# Patient Record
Sex: MALE | Race: WHITE | NOT HISPANIC OR LATINO | Employment: FULL TIME | URBAN - METROPOLITAN AREA
[De-identification: names, ages, dates, MRNs, and addresses within clinical notes are randomized per-mention and may not be internally consistent; named-entity substitution may affect disease eponyms.]

---

## 2018-07-19 ENCOUNTER — OFFICE VISIT (OUTPATIENT)
Dept: FAMILY MEDICINE | Facility: CLINIC | Age: 33
End: 2018-07-19
Payer: COMMERCIAL

## 2018-07-19 VITALS
HEART RATE: 74 BPM | WEIGHT: 233.9 LBS | HEIGHT: 76 IN | TEMPERATURE: 97.8 F | RESPIRATION RATE: 16 BRPM | BODY MASS INDEX: 28.48 KG/M2 | OXYGEN SATURATION: 98 % | DIASTOLIC BLOOD PRESSURE: 78 MMHG | SYSTOLIC BLOOD PRESSURE: 112 MMHG

## 2018-07-19 DIAGNOSIS — Z00.00 ROUTINE GENERAL MEDICAL EXAMINATION AT A HEALTH CARE FACILITY: Primary | ICD-10-CM

## 2018-07-19 DIAGNOSIS — M72.2 PLANTAR FASCIITIS: ICD-10-CM

## 2018-07-19 DIAGNOSIS — Z13.6 CARDIOVASCULAR SCREENING; LDL GOAL LESS THAN 160: ICD-10-CM

## 2018-07-19 LAB
CHOLEST SERPL-MCNC: 211 MG/DL
HDLC SERPL-MCNC: 46 MG/DL
LDLC SERPL CALC-MCNC: 120 MG/DL
NONHDLC SERPL-MCNC: 165 MG/DL
TRIGL SERPL-MCNC: 223 MG/DL

## 2018-07-19 PROCEDURE — 80061 LIPID PANEL: CPT | Performed by: FAMILY MEDICINE

## 2018-07-19 PROCEDURE — 36415 COLL VENOUS BLD VENIPUNCTURE: CPT | Performed by: FAMILY MEDICINE

## 2018-07-19 PROCEDURE — 99395 PREV VISIT EST AGE 18-39: CPT | Performed by: FAMILY MEDICINE

## 2018-07-19 ASSESSMENT — PAIN SCALES - GENERAL: PAINLEVEL: NO PAIN (0)

## 2018-07-19 NOTE — MR AVS SNAPSHOT
After Visit Summary   7/19/2018    Zaire Olivares    MRN: [id number]           Patient Information     Date Of Birth          1985        Visit Information        Provider Department      7/19/2018 7:40 AM Luis Eddy MD [facility]        Today's Diagnoses     Routine general medical examination at a health care facility    -  1    CARDIOVASCULAR SCREENING; LDL GOAL LESS THAN 160        Plantar fasciitis          Care Instructions      Preventive Health Recommendations  Male Ages 26 - 39    Yearly exam:             See your health care provider every year in order to  o   Review health changes.   o   Discuss preventive care.    o   Review your medicines if your doctor has prescribed any.    You should be tested each year for STDs (sexually transmitted diseases), if you re at risk.     After age 35, talk to your provider about cholesterol testing. If you are at risk for heart disease, have your cholesterol tested at least every 5 years.     If you are at risk for diabetes, you should have a diabetes test (fasting glucose).  Shots: Get a flu shot each year. Get a tetanus shot every 10 years.     Nutrition:    Eat at least 5 servings of fruits and vegetables daily.     Eat whole-grain bread, whole-wheat pasta and brown rice instead of white grains and rice.     Get adequate Calcium and Vitamin D.     Lifestyle    Exercise for at least 150 minutes a week (30 minutes a day, 5 days a week). This will help you control your weight and prevent disease.     Limit alcohol to one drink per day.     No smoking.     Wear sunscreen to prevent skin cancer.     See your dentist every six months for an exam and cleaning.             Follow-ups after your visit        Who to contact     If you have questions or need follow up information about today's clinic visit or your schedule please contact [facility] directly at [phone].  Normal or non-critical lab and imaging  "results will be communicated to you by MyChart, letter or phone within 4 business days after the clinic has received the results. If you do not hear from us within 7 days, please contact the clinic through [email or web address] or phone. If you have a critical or abnormal lab result, we will notify you by phone as soon as possible.  Submit refill requests through [email or web address] or call your pharmacy and they will forward the refill request to us. Please allow 3 business days for your refill to be completed.          Additional Information About Your Visit        [email or web address] Information     [email or web address] gives you secure access to your electronic health record. If you see a primary care provider, you can also send messages to your care team and make appointments. If you have questions, please call your primary care clinic.  If you do not have a primary care provider, please call [phone] and they will assist you.        Care EveryWhere ID     This is your Care EveryWhere ID. This could be used by other organizations to access your [place] medical records  [id number]        Your Vitals Were     Pulse Temperature Respirations Height Pulse Oximetry BMI (Body Mass Index)    74 97.8  F (36.6  C) (Temporal) 16 6' 3.5\" (1.918 m) 98% 28.85 kg/m2       Blood Pressure from Last 3 Encounters:   07/19/18 112/78   07/18/16 110/66   02/05/16 118/70    Weight from Last 3 Encounters:   07/19/18 233 lb 14.4 oz (106.1 kg)   07/18/16 233 lb 3.2 oz (105.8 kg)   02/05/16 240 lb (108.9 kg)              We Performed the Following     Lipid Profile        Primary Care Provider Office Phone # Fax #    Luis Eddy [phone]       [address] [facility] [phone]        Equal Access to Services     MICK REECE AH: Hadmirela craig Sovilma, waaxda luqadaha, qaybta kaalmada adeegyada, ambrosio love. So [facility] [phone].    ATENCIÓN: Si habla español, tiene a hansen disposición servicios gratuitos de " cipriano lingüístickathy. Randa al [phone].    We comply with applicable federal civil rights laws and [place] laws. We do not discriminate on the basis of race, color, national origin, age, disability, sex, sexual orientation, or gender identity.            Thank you!     Thank you for choosing [facility]  for your care. Our goal is always to provide you with excellent care. Hearing back from our patients is one way we can continue to improve our services. Please take a few minutes to complete the written survey that you may receive in the mail after your visit with us. Thank you!             Your Updated Medication List - Protect others around you: Learn how to safely use, store and throw away your medicines at www.disposemymeds.org.      Notice  As of 7/19/2018  8:20 AM    You have not been prescribed any medications.

## 2018-07-19 NOTE — PROGRESS NOTES
SUBJECTIVE:   CC: Zaire Olivares is an 33 year old male who presents for preventative health visit.     Physical   Annual:     Getting at least 3 servings of Calcium per day:  Yes    Bi-annual eye exam:  NO    Dental care twice a year:  Yes    Sleep apnea or symptoms of sleep apnea:  None    Diet:  Regular (no restrictions)    Frequency of exercise:  2-3 days/week    Duration of exercise:  15-30 minutes    Taking medications regularly:  Yes    Medication side effects:  Not applicable, None, No muscle aches and No significant flushing        Discuss elbow pain; right     Today's PHQ-2 Score:   PHQ-2 ( 1999 [employer]) 7/19/2018   Q1: Little interest or pleasure in doing things 0   Q2: Feeling down, depressed or hopeless 0   PHQ-2 Score 0   Q1: Little interest or pleasure in doing things Not at all   Q2: Feeling down, depressed or hopeless Not at all   PHQ-2 Score 0       Abuse: Current or Past(Physical, Sexual or Emotional)- No  Do you feel safe in your environment - Yes    Social History   Substance Use Topics     Smoking status: Never Smoker     Smokeless tobacco: Never Used     Alcohol use 0.0 oz/week     0 Standard drinks or equivalent per week      Comment: weekend     Alcohol Use 7/19/2018   If you drink alcohol do you typically have greater than 3 drinks per day OR greater than 7 drinks per week? Not Applicable   No flowsheet data found.    Last PSA: No results found for: PSA    Reviewed orders with patient. Reviewed health maintenance and updated orders accordingly - Yes      Reviewed and updated as needed this visit by clinical staff  Tobacco  Allergies  Meds  Med Hx  Surg Hx  Fam Hx  Soc Hx        Reviewed and updated as needed this visit by Provider        History reviewed. No pertinent past medical history.   Past Surgical History:   Procedure Laterality Date     VASECTOMY  2/5/2016       Review of Systems  CONSTITUTIONAL: NEGATIVE for fever, chills, change in weight  INTEGUMENTARY/SKIN: NEGATIVE for  worrisome rashes, moles or lesions  EYES: NEGATIVE for vision changes or irritation  ENT: NEGATIVE for ear, mouth and throat problems  RESP: NEGATIVE for significant cough or SOB  CV: NEGATIVE for chest pain, palpitations or peripheral edema  GI: NEGATIVE for nausea, abdominal pain, heartburn, or change in bowel habits   male: negative for dysuria, hematuria, decreased urinary stream, erectile dysfunction, urethral discharge  MUSCULOSKELETAL: Some pain lateral anterior on the right elbow he feels he may have hyperextended it couple of weeks ago.  NEURO: NEGATIVE for weakness, dizziness or paresthesias  PSYCHIATRIC: NEGATIVE for changes in mood or affect    OBJECTIVE:   There were no vitals taken for this visit.    Physical Exam  GENERAL: healthy, alert and no distress  EYES: Eyes grossly normal to inspection, PERRL and conjunctivae and sclerae normal  HENT: ear canals and TM's normal, nose and mouth without ulcers or lesions  NECK: no adenopathy, no asymmetry, masses, or scars and thyroid normal to palpation  RESP: lungs clear to auscultation - no rales, rhonchi or wheezes  CV: regular rate and rhythm, normal S1 S2, no S3 or S4, no murmur, click or rub, no peripheral edema and peripheral pulses strong  ABDOMEN: soft, nontender, no hepatosplenomegaly, no masses and bowel sounds normal  MS: Full range of motion in fact extended extension of the right elbow noted.  No deformity.  Just slightly tender lateral to the lateral epicondyle.  SKIN: no suspicious lesions or rashes  NEURO: Normal strength and tone, mentation intact and speech normal  PSYCH: mentation appears normal, affect normal/bright        ASSESSMENT/PLAN:   1. CARDIOVASCULAR SCREENING; LDL GOAL LESS THAN 160  We will notify with results  - Lipid Profile    2. Plantar fasciitis  Referral to podiatry for orthotics    3. Routine general medical examination at a health care facility  Generally healthy.  He will follow-up with sports medicine of the elbow  "does not seem to be getting better.      COUNSELING:       BP Readings from Last 1 Encounters:   07/18/16 110/66     Estimated body mass index is 29.15 kg/(m^2) as calculated from the following:    Height as of 2/5/16: 6' 3\" (1.905 m).    Weight as of 7/18/16: 233 lb 3.2 oz (105.8 kg).           reports that he has never smoked. He has never used smokeless tobacco.      Counseling Resources:  ATP IV Guidelines  Pooled Cohorts Equation Calculator  FRAX Risk Assessment  ICSI Preventive Guidelines  Dietary Guidelines for Americans, 2010  USDA's MyPlate  ASA Prophylaxis  Lung CA Screening    Luis Eddy MD  [facility]  "

## 2018-07-30 NOTE — PROGRESS NOTES
Cholesterol was 211 which is borderline elevated.   LDL cholesterol was 120 which is acceptable.  Just watch fatty foods and calories in your diet

## 2018-08-12 ENCOUNTER — MYC MEDICAL ADVICE (OUTPATIENT)
Dept: FAMILY MEDICINE | Facility: CLINIC | Age: 33
End: 2018-08-12

## 2018-08-12 DIAGNOSIS — M25.521 RIGHT ELBOW PAIN: Primary | ICD-10-CM

## 2018-08-15 ENCOUNTER — RADIANT APPOINTMENT (OUTPATIENT)
Dept: GENERAL RADIOLOGY | Facility: CLINIC | Age: 33
End: 2018-08-15
Attending: ORTHOPAEDIC SURGERY
Payer: COMMERCIAL

## 2018-08-15 ENCOUNTER — OFFICE VISIT (OUTPATIENT)
Dept: ORTHOPEDICS | Facility: CLINIC | Age: 33
End: 2018-08-15
Payer: COMMERCIAL

## 2018-08-15 VITALS
HEIGHT: 76 IN | DIASTOLIC BLOOD PRESSURE: 73 MMHG | SYSTOLIC BLOOD PRESSURE: 114 MMHG | WEIGHT: 236 LBS | BODY MASS INDEX: 28.74 KG/M2

## 2018-08-15 DIAGNOSIS — M25.521 ELBOW PAIN, RIGHT: ICD-10-CM

## 2018-08-15 DIAGNOSIS — M77.11 LATERAL EPICONDYLITIS OF RIGHT ELBOW: Primary | ICD-10-CM

## 2018-08-15 PROCEDURE — 73080 X-RAY EXAM OF ELBOW: CPT | Mod: TC

## 2018-08-15 PROCEDURE — 99203 OFFICE O/P NEW LOW 30 MIN: CPT | Mod: 25 | Performed by: ORTHOPAEDIC SURGERY

## 2018-08-15 PROCEDURE — 20551 NJX 1 TENDON ORIGIN/INSJ: CPT | Mod: LT | Performed by: ORTHOPAEDIC SURGERY

## 2018-08-15 ASSESSMENT — PAIN SCALES - GENERAL: PAINLEVEL: NO PAIN (0)

## 2018-08-15 NOTE — PROGRESS NOTES
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  He has 4 boys all within 2 years of each other 9, 7, 5, 3.      He is being seen for   Chief Complaints and History of Present Illnesses   Patient presents with     Consult     rt elbow pain          History of Present Illness:   Mechanism of Injury: Patient's arm got caught behind him when he was playing basketball when he pulled his arm loose he heard a pop.  Location: Right upper extremity  Duration of Pain: Since May 2018  Rating of Pain: 0 out of 10 today  Pain Quality: Achy and sore with activity  Pain is better with: Rest  Pain is worse with: Activity such as baling hay.  Treatment so far consists of: No treatment no medications no injections no therapy  Associated Features: Lateral elbow pain.  Prior history of related problems: No previous history of surgery injury or trauma to the right elbow  Pain is Limiting:  nothing  Here to: Orthopedic consultation  The Pain Has: Been about the same but steadily has been getting better however it still is intermittent.  Additional History: Patient denies any numbness or tingling or catching or locking or swelling or bruising.  The pain is worse with increased activity.  He has been able to throw baseball without any problems.  If he is very active with that it will be sore for 2 days afterwards.    Patient's past medical, surgical, social and family histories reviewed.     No past medical history on file.      Past Surgical History:   Procedure Laterality Date     VASECTOMY  2/5/2016       Medications:    No current outpatient prescriptions on file prior to visit.  No current facility-administered medications on file prior to visit.     Allergies   Allergen Reactions     No Known Allergies        Social History     Occupational History     Not on file.     Social History  "Main Topics     Smoking status: Never Smoker     Smokeless tobacco: Never Used     Alcohol use 0.0 oz/week     0 Standard drinks or equivalent per week      Comment: weekend     Drug use: No     Sexual activity: Yes     Partners: Female     Birth control/ protection: Surgical, Male Surgical      Comment: vasectomy       Family History   Problem Relation Age of Onset     HEART DISEASE Father      HEART DISEASE Paternal Grandfather        REVIEW OF SYSTEMS  10 point review systems performed otherwise negative as noted as per history of present illness.    Physical Exam:  Vitals: /73  Ht 1.918 m (6' 3.5\")  Wt 107 kg (236 lb)  BMI 29.11 kg/m2  BMI= Body mass index is 29.11 kg/(m^2).    Constitutional: healthy, alert and no acute distress   Psychiatric: mentation appears normal and affect normal/bright  NEURO: no focal deficits, CMS intact right upper extremity  RESP: Normal with easy respirations and no use of accessory muscles to breathe, no audible wheezing or retractions  CV: +2 radial pulse and his hand is warm to palpation.   SKIN: No erythema, rashes, excoriation, or breakdown. No evidence of infection.   MUSCULOSKELETAL:    INSPECTION of right elbow: No gross deformities, erythema, edema, ecchymosis, atrophy or fasciculations.     PALPATION: No tenderness on palpation over the medial epicondyles or olecranon.  Tenderness to palpation over the lateral epicondyle more on the posterior side.    ROM: Patient able to hyperextend elbow approximately 5-10  and flexes to 160  of flexion.  Range of motion is without catching locking or pain.  Patient is able to supinate pronate without any problems either.     STRENGTH: 5 out of 5 biceps triceps and wrist flexion and extension today patient's wrist extension testing does cause pain at the lateral upper condyle.    SPECIAL TEST: None today.  GAIT: non-antalgic  Lymph: no palpable lymph nodes    Diagnostic Modalities:  Recent Results (from the past 744 hour(s)) "   XR Elbow Right G/E 3 Views    Narrative    RIGHT ELBOW THREE VIEWS  8/15/2018 3:20 PM     HISTORY:  Elbow pain.    COMPARISON: None.      Impression    IMPRESSION: Possible nondisplaced fracture of the neck of the radius  with cortical angulation on the AP view. However the fat pads are not  displaced.     We do not agree with the reading when correlated with the exam.  Independent visualization of the images was performed.    Impression: 1.  Right elbow lateral epicondylitis    Plan:  All of the above pertinent physical exam and imaging modalities findings was reviewed with Zaire.                                          INJECTION PROCEDURE:  The patient was counseled about an  injection, including discussion of risks (including infection), contents of the injection, rationale for performing the injection, and expected benefits of the injection. The skin was prepped with alcohol and betadine and then utilizing sterile technique an injection of the right elbow lateral epicondyle injection was performed. I palpated to find the area of most tenderness, then I used malina chloride spray prior to doing the injection. I made sure he did not have any shooting, burning or electric sensation prior to injecting. The injection consisted 1ml of Kenalog (40mg per 1ml) with 3ml 1% lidocaine plain. The patient tolerated the injection well, and there were no complications. The injection site was covered with a Band-Aid. The injection was performed by BRANDEN Kaur    Patient Instructions:  1. On exam this looks like lateral epicondylitis.  That is an irritation of the tendons that go across the outside of your elbow.   2. A forearm band can help take some of the pressure off the area.  Even a wrist brace can help.  We decided to hold on that for today.  3. Stretches such as wrist flexion stretches can help relax those tendons.  Those exercises are below.    4. Formal therapy can always help.  We talked about formal occupational  therapy, but decided to hold for now.    5. Mobic is a medication that could help you. We discussed the Mobic as a prescription anti-inflammatory medication you could also try but we decided to hold off on that today.    6. A cortisone injection can benefit you.  It is like taking very strong ibuprofen and putting it right were the pain and inflammation is.  7. You can followup with Donya Bey MD in 6 weeks, if you are having pain at that time we can do a cortisone injection.  You can always cancel the appointment if you are doing really well and follow-up as needed.    Re-x-ray on return: No    BP Readings from Last 1 Encounters:   08/15/18 114/73       BP noted to be well controlled today in office.      Patient does not use Tobacco products.    Scribed by Chai Vallejo PA-C on 8/15/2018 at 4:14 PM, based on Dr. Donya Bey's statements to me.    This note was dictated with [employer].    GIA Chen MD

## 2018-08-15 NOTE — LETTER
8/15/2018         RE: Zaire Olivares  [id number] [facility] [address]        Dear Colleague,    Thank you for referring your patient, Zaire Olivares, to the [facility]. Please see a copy of my visit note below.    Zaire Olivares is a 33 year old male who is seen in consultation at the request of .  History of Present illness:  Zaire presents for evaluation of:  1.) rt elbow  Onset: 5/2018  Symptoms brought on by: baseball.   Character:  radiation of pain down arm rom difficulties .    Progression of symptoms:  same.    Previous similar pain: no .   Pain Level:  0/10.   Previous treatments:  nothing.  Currently on Blood thinners? No  Diagnosis of Diabetes? 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  He has 4 boys all within 2 years of each other 9, 7, 5, 3.      He is being seen for   Chief Complaints and History of Present Illnesses   Patient presents with     Consult     rt elbow pain          History of Present Illness:   Mechanism of Injury: Patient's arm got caught behind him when he was playing basketball when he pulled his arm loose he heard a pop.  Location: Right upper extremity  Duration of Pain: Since May 2018  Rating of Pain: 0 out of 10 today  Pain Quality: Achy and sore with activity  Pain is better with: Rest  Pain is worse with: Activity such as baling hay.  Treatment so far consists of: No treatment no medications no injections no therapy  Associated Features: Lateral elbow pain.  Prior history of related problems: No previous history of surgery injury or trauma to the right elbow  Pain is Limiting:  nothing  Here to: Orthopedic consultation  The Pain Has: Been about the same but steadily has been getting better however it still is intermittent.  Additional History: Patient denies any numbness or  "tingling or catching or locking or swelling or bruising.  The pain is worse with increased activity.  He has been able to throw baseball without any problems.  If he is very active with that it will be sore for 2 days afterwards.    Patient's past medical, surgical, social and family histories reviewed.     No past medical history on file.      Past Surgical History:   Procedure Laterality Date     VASECTOMY  2/5/2016       Medications:    No current outpatient prescriptions on file prior to visit.  No current facility-administered medications on file prior to visit.     Allergies   Allergen Reactions     No Known Allergies        Social History     Occupational History     Not on file.     Social History Main Topics     Smoking status: Never Smoker     Smokeless tobacco: Never Used     Alcohol use 0.0 oz/week     0 Standard drinks or equivalent per week      Comment: weekend     Drug use: No     Sexual activity: Yes     Partners: Female     Birth control/ protection: Surgical, Male Surgical      Comment: vasectomy       Family History   Problem Relation Age of Onset     HEART DISEASE Father      HEART DISEASE Paternal Grandfather        REVIEW OF SYSTEMS  10 point review systems performed otherwise negative as noted as per history of present illness.    Physical Exam:  Vitals: /73  Ht 1.918 m (6' 3.5\")  Wt 107 kg (236 lb)  BMI 29.11 kg/m2  BMI= Body mass index is 29.11 kg/(m^2).    Constitutional: healthy, alert and no acute distress   Psychiatric: mentation appears normal and affect normal/bright  NEURO: no focal deficits, CMS intact right upper extremity  RESP: Normal with easy respirations and no use of accessory muscles to breathe, no audible wheezing or retractions  CV: +2 radial pulse and his hand is warm to palpation.   SKIN: No erythema, rashes, excoriation, or breakdown. No evidence of infection.   MUSCULOSKELETAL:    INSPECTION of right elbow: No gross deformities, erythema, edema, ecchymosis, " atrophy or fasciculations.     PALPATION: No tenderness on palpation over the medial epicondyles or olecranon.  Tenderness to palpation over the lateral epicondyle more on the posterior side.    ROM: Patient able to hyperextend elbow approximately 5-10  and flexes to 160  of flexion.  Range of motion is without catching locking or pain.  Patient is able to supinate pronate without any problems either.     STRENGTH: 5 out of 5 biceps triceps and wrist flexion and extension today patient's wrist extension testing does cause pain at the lateral upper condyle.    SPECIAL TEST: None today.  GAIT: non-antalgic  Lymph: no palpable lymph nodes    Diagnostic Modalities:  Recent Results (from the past 744 hour(s))   XR Elbow Right G/E 3 Views    Narrative    RIGHT ELBOW THREE VIEWS  8/15/2018 3:20 PM     HISTORY:  Elbow pain.    COMPARISON: None.      Impression    IMPRESSION: Possible nondisplaced fracture of the neck of the radius  with cortical angulation on the AP view. However the fat pads are not  displaced.     We do not agree with the reading when correlated with the exam.  Independent visualization of the images was performed.    Impression: 1.  Right elbow lateral epicondylitis    Plan:  All of the above pertinent physical exam and imaging modalities findings was reviewed with Zaire.                                          INJECTION PROCEDURE:  The patient was counseled about an  injection, including discussion of risks (including infection), contents of the injection, rationale for performing the injection, and expected benefits of the injection. The skin was prepped with alcohol and betadine and then utilizing sterile technique an injection of the right elbow lateral epicondyle injection was performed. I palpated to find the area of most tenderness, then I used malina chloride spray prior to doing the injection. I made sure he did not have any shooting, burning or electric sensation prior to injecting. The  injection consisted 1ml of Kenalog (40mg per 1ml) with 3ml 1% lidocaine plain. The patient tolerated the injection well, and there were no complications. The injection site was covered with a Band-Aid. The injection was performed by BRANDEN Kaur    Patient Instructions:  1. On exam this looks like lateral epicondylitis.  That is an irritation of the tendons that go across the outside of your elbow.   2. A forearm band can help take some of the pressure off the area.  Even a wrist brace can help.  We decided to hold on that for today.  3. Stretches such as wrist flexion stretches can help relax those tendons.  Those exercises are below.    4. Formal therapy can always help.  We talked about formal occupational therapy, but decided to hold for now.    5. Mobic is a medication that could help you. We discussed the Mobic as a prescription anti-inflammatory medication you could also try but we decided to hold off on that today.    6. A cortisone injection can benefit you.  It is like taking very strong ibuprofen and putting it right were the pain and inflammation is.  7. You can followup with oDnya Bey MD in 6 weeks, if you are having pain at that time we can do a cortisone injection.  You can always cancel the appointment if you are doing really well and follow-up as needed.    Re-x-ray on return: No    BP Readings from Last 1 Encounters:   08/15/18 114/73       BP noted to be well controlled today in office.      Patient does not use Tobacco products.    Scribed by Chai Vallejo PA-C on 8/15/2018 at 4:14 PM, based on Dr. Donya Bey's statements to me.    This note was dictated with [employer].    GIA Chen MD      Again, thank you for allowing me to participate in the care of your patient.        Sincerely,        Donya Bey MD

## 2018-08-15 NOTE — MR AVS SNAPSHOT
After Visit Summary   8/15/2018    Zaire Olivares    MRN: [id number]           Patient Information     Date Of Birth          1985        Visit Information        Provider Department      8/15/2018 3:30 PM Donya Bey MD [facility]        Today's Diagnoses     Lateral epicondylitis of right elbow    -  1      Care Instructions    Encounter Diagnosis   Name Primary?     Lateral epicondylitis of right elbow Yes     Rest, ice and elevate above heart level as needed for pain control  1. On exam this looks like lateral epicondylitis.  That is an irritation of the tendons that go across the outside of your elbow.   2. A forearm band can help take some of the pressure off the area.  Even a wrist brace can help.  We decided to hold on that for today.  3. Stretches such as wrist flexion stretches can help relax those tendons.  Those exercises are below.    4. Formal therapy can always help.  We talked about formal occupational therapy, but decided to hold for now.    5. Mobic is a medication that could help you. We discussed the Mobic as a prescription anti-inflammatory medication you could also try but we decided to hold off on that today.    6. A cortisone injection can benefit you.  It is like taking very strong ibuprofen and putting it right were the pain and inflammation is.  7. You can followup with Donya Bey MD in 6 weeks, if you are having pain at that time we can do a cortisone injection.  You can always cancel the appointment if you are doing really well and follow-up as needed.    WebMD and [email or web address] may offer reliable information regarding your diagnosis and treatment plan.  Cortisone Instructions:     1. You received an injection of cortisone into your right elbow today.  2. The joint(s) may be more painful for the first 1-2 days.  3. We ask you to continue to rest the joint(s) for a few more days before resuming regular activities.  4. Pain Medications  you can take (as long as your primary care provider allows these meds and you do not have kidney or liver conditions):  Tylenol  Take 1000 mg by mouth every 6 hours as needed; maximum dose 4000 mg a day  Ibuprofen  600 mg every 6 hours as needed; maximum 2400 mg a day  (OK to take tylenol and ibuprofen at the same time)  5. Rest, ice and elevate as needed for pain control  6. Watch for these signs of infection: redness, swelling, drainage, warmth to touch, increased pain, or fever. Call the clinic or make an appointment to be seen if you think you have an infection.  7. If you are diabetic, make sure you keep a close eye on your blood sugars, they can get elevated with cortisone injections.   8. Sometimes it can take 1-2 weeks for it to reach its full effect.    Cortisone Injections  Cortisone is a type of steroid. It can greatly reduce swelling, redness, and irritation (inflammation) and pain. Being injected with cortisone is simple and doesn t take long. Your doctor may ask you questions about your health. Certain health conditions, such as diabetes, can be affected by cortisone.     Your pain may be relieved by a cortisone injection.   Why have a cortisone injection?  Injecting cortisone can relieve pain for anything from a sports injury to arthritis. Your doctor may suggest an injection if rest, splints, or oral medicine doesn t relieve your pain. Injecting cortisone is simpler than having surgery. And cortisone may provide the lasting pain relief that can help you get out and enjoy life again.  Getting the injection  Your doctor will start by cleaning and occasionally numbing your skin at the injection site. Next you ll be injected with local anesthetics (for short-term pain relief) and cortisone. The injection may last a few moments. A small bandage will be put over the injection site. You ll then be ready to go home.  After your injection  After being injected, make sure you don t injure the treated region.  But stay active. Enjoy a walk or some other mild activity. Just be careful not to strain the region that gave you trouble.  The next day  Some people feel more pain after being injected. This is normal, and it will go away soon. Applying ice for 20 minutes at a time to your injury may reduce the increased pain. Rest for the first day or two. You don t need to stay in bed. But avoid tasks that may strain the injured region.  If you have diabetes  Cortisone injections can cause blood sugar to be increased for several days after the injection. If you have diabetes, you should follow your blood  sugar closely during this time. Follow your regular plan for what to do when your blood sugar is elevated.     7205-5033 [employer]. [address] [place] [address], [place] [address]. All rights reserved. This information is not intended as a substitute for professional medical care. Always follow your healthcare professional's instructions.     THANK YOU for coming in today. If you receive a survey via [employer] or mail please let us know if there was anything you especially appreciated today or if there is any way we can improve our clinic. We appreciate your input.    GENERAL INFORMATION:  Our hours are:  Monday :     Clinic 7:30 AM-430 PM ([facility])  Tuesday:      Operating Room All Day ([facility])  Wednesday: Clinic 7:30 AM - 11:15 AM ([facility])             Clinic 1:00 PM - 4:00PM ([facility])  Thursday:     Administrative Day  Friday:          Clinic 7:30 AM - 11:15 AM ([facility])            Clinic 1:00 PM - 4:00 PM ([facility])    [facility] for any issues or concerns: [phone]      We are not in the office Thursdays. Therefore non- urgent calls and medical messages received on Thursday will be addressed when we are back in the office on Wednesday. Urgent matters will be reviewed and  addressed by one of our partners in the office as needed.    If lab work was done today as part of your evaluation you will generally be contacted via [employer], mail, or phone with the results within 1-5 days. If there is an alarming result we will contact you by phone. Lab results come back at varying times, I generally wait until all labs are resulted before making comments on results. Please note labs are automatically released to [employer] (if you have signed up for it) once available-at times you may see these prior to my having a chance to review them as well.    If you need refills please contact your pharmacist. They will send a refill request to me to review. Please allow 3 business days for us to process all refill requests. All narcotic refills should be handled in the clinic at the time of your visit.  What is Tennis Elbow?        Repeated twisting of a screwdriver can cause problems over time.        Tennis elbow (also called lateral epicondylitis) is the gradual break down of the tendon around the bony knob (lateral epicondyle) on the outer side of the elbow. It occurs when the tissue that attaches muscle to the bone becomes irritated and somtimes inflamed.  Your lateral epicondyle  The muscles that allow you to straighten your fingers and rotate your lower arm and wrist are called the extensor muscles.  These muscles extend from the outer side of your elbow to your wrist and fingers. A cord-like fiber called a tendon attaches the extensor muscles to the elbow. Overuse or an accident can cause tissue in the tendon to become inflamed, injured, or degenerated.  Causes  Playing a racket sport can cause tennis elbow. So can doing anything that involves extending your wrist or rotating your forearm, such as:    Twisting a screwdriver    Hammering    Painting    Llifting heavy objects with your palm down  With age, the tissue may become injured or irritated more easily.  Symptoms  Symptoms generally develop  over time. The most common symptom of tennis elbow is pain or burning on the outer side of the elbow and down the forearm. You may have pain all the time or only when you lift things. The elbow may also swell. And it may hurt to  things, turn your hand, or swing your arm.      The road to healing  To prevent a flare-up after treatment, you may need to change the way you do some things. Gripping with the palm up, lifting heavy objects with both hands, or varying activities throughout the day will help reduce stress on the tendon.     1604-2674 [employer]. [address] [place] [address]. All rights reserved. This information is not intended as a substitute for professional medical care. Always follow your healthcare professional's instructions.      Treating Tennis Elbow    Your treatment will depend on how inflamed your tendon is. The goal is to relieve your symptoms and help you regain full use of your elbow.  Rest and medicine  Wearing a tennis elbow splint allows the inflamed tendon to rest. It must be worn properly. It should be placed down the arm past the painful area of the elbow. If it is directly over the inflamed tendon, it can worsen the symptoms. This brace can help the tendon heal. Using your other hand or changing your  also takes stress off the tendon. Oral nonsteroidal anti-inflammatory medicines (NSAIDs) and/or ice can relieve pain and reduce swelling.  Exercises and therapy  Your healthcare provider may give you an exercise program. He or she may refer you to a therapist. The therapist will teach you to gently stretch and then strengthen the muscles around your elbow.  Anti-inflammatory injections  Your healthcare provider may give you injections of an anti-inflammatory, such as cortisone. This helps reduce swelling. You may have more pain at first. But in a few days, your elbow should feel better.  If surgery is needed  If your symptoms persist for a long time, or  other treatments don t work, your healthcare provider may recommend surgery. Surgery repairs the inflamed tendon.     4112-5069 [employer]. [address] [place] [address]. All rights reserved. This information is not intended as a substitute for professional medical care. Always follow your healthcare professional's instructions.           Wrist Flexion (Flexibility)    1. Stand or sit and hold your arms straight out in front of you.  2. Dangle your right hand at the wrist. Gently press down on your right hand with your left hand. Feel the stretch in your right wrist and the top of your hand.  3. Hold for 30 to 60 seconds, then relax.  4. Switch arms and repeat 2 times.   Date Last Reviewed: 3/10/2016    7683-1878 [employer]. [address] [place] [address]. All rights reserved. This information is not intended as a substitute for professional medical care. Always follow your healthcare professional's instructions.               Follow-ups after your visit        Who to contact     If you have questions or need follow up information about today's clinic visit or your schedule please contact [facility] directly at [phone].  Normal or non-critical lab and imaging results will be communicated to you by MyChart, letter or phone within 4 business days after the clinic has received the results. If you do not hear from us within 7 days, please contact the clinic through [employer] or phone. If you have a critical or abnormal lab result, we will notify you by phone as soon as possible.  Submit refill requests through [employer] or call your pharmacy and they will forward the refill request to us. Please allow 3 business days for your refill to be completed.          Additional Information About Your Visit        [employer] Information     [employer] gives you secure access to your electronic health record. If you see a primary care provider, you can also send  "messages to your care team and make appointments. If you have questions, please call your primary care clinic.  If you do not have a primary care provider, please call [phone] and they will assist you.        Care EveryWhere ID     This is your Care EveryWhere ID. This could be used by other organizations to access your [facility] medical records  [id number]        Your Vitals Were     Height BMI (Body Mass Index)                1.918 m (6' 3.5\") 29.11 kg/m2           Blood Pressure from Last 3 Encounters:   08/15/18 114/73   07/19/18 112/78   07/18/16 110/66    Weight from Last 3 Encounters:   08/15/18 107 kg (236 lb)   07/19/18 106.1 kg (233 lb 14.4 oz)   07/18/16 105.8 kg (233 lb 3.2 oz)               Primary Care Provider Office Phone # Fax #    Luis Eddy [phone]       [address] [facility] [id number]        Equal Access to Services     MICK REECE : Hadii aad ku hadasho Soomaali, waaxda luqadaha, qaybta kaalmada adeegyada, ambrosio preston haytatiana hebert . So [facility] [phone].    ATENCIÓN: Si habla español, tiene a hansen disposición servicios gratuitos de asistencia lingüística. [facility] [phone].    We comply with applicable federal civil rights laws and [place] laws. We do not discriminate on the basis of race, color, national origin, age, disability, sex, sexual orientation, or gender identity.            Thank you!     Thank you for choosing [facility]  for your care. Our goal is always to provide you with excellent care. Hearing back from our patients is one way we can continue to improve our services. Please take a few minutes to complete the written survey that you may receive in the mail after your visit with us. Thank you!             Your Updated Medication List - Protect others around you: Learn how to safely use, store and throw away your medicines at www.disposemymeds.org.      Notice  As of 8/15/2018  4:11 PM    You have " not been prescribed any medications.

## 2018-08-15 NOTE — PROGRESS NOTES
Zaire Olivares is a 33 year old male who is seen in consultation at the request of .  History of Present illness:  Zaire presents for evaluation of:  1.) rt elbow  Onset: 5/2018  Symptoms brought on by: baseball.   Character:  radiation of pain down arm rom difficulties .    Progression of symptoms:  same.    Previous similar pain: no .   Pain Level:  0/10.   Previous treatments:  nothing.  Currently on Blood thinners? No  Diagnosis of Diabetes? No

## 2019-03-26 ENCOUNTER — OFFICE VISIT (OUTPATIENT)
Dept: FAMILY MEDICINE | Facility: CLINIC | Age: 34
End: 2019-03-26
Payer: COMMERCIAL

## 2019-03-26 VITALS
TEMPERATURE: 98.2 F | SYSTOLIC BLOOD PRESSURE: 112 MMHG | RESPIRATION RATE: 14 BRPM | BODY MASS INDEX: 29.84 KG/M2 | HEART RATE: 74 BPM | DIASTOLIC BLOOD PRESSURE: 80 MMHG | HEIGHT: 75 IN | OXYGEN SATURATION: 99 % | WEIGHT: 240 LBS

## 2019-03-26 DIAGNOSIS — M79.89 SWELLING OF LEFT RING FINGER: ICD-10-CM

## 2019-03-26 DIAGNOSIS — R21 RASH AND NONSPECIFIC SKIN ERUPTION: Primary | ICD-10-CM

## 2019-03-26 LAB
ALBUMIN SERPL-MCNC: 3.9 G/DL (ref 3.4–5)
ALP SERPL-CCNC: 45 U/L (ref 40–150)
ALT SERPL W P-5'-P-CCNC: 23 U/L (ref 0–70)
ANION GAP SERPL CALCULATED.3IONS-SCNC: 8 MMOL/L (ref 3–14)
AST SERPL W P-5'-P-CCNC: 14 U/L (ref 0–45)
BASOPHILS # BLD AUTO: 0 10E9/L (ref 0–0.2)
BASOPHILS NFR BLD AUTO: 0.6 %
BILIRUB SERPL-MCNC: 0.5 MG/DL (ref 0.2–1.3)
BUN SERPL-MCNC: 17 MG/DL (ref 7–30)
CALCIUM SERPL-MCNC: 8.9 MG/DL (ref 8.5–10.1)
CHLORIDE SERPL-SCNC: 104 MMOL/L (ref 94–109)
CO2 SERPL-SCNC: 26 MMOL/L (ref 20–32)
CREAT SERPL-MCNC: 1.01 MG/DL (ref 0.66–1.25)
DIFFERENTIAL METHOD BLD: NORMAL
EOSINOPHIL NFR BLD AUTO: 1.5 %
ERYTHROCYTE [DISTWIDTH] IN BLOOD BY AUTOMATED COUNT: 12.1 % (ref 10–15)
GFR SERPL CREATININE-BSD FRML MDRD: >90 ML/MIN/{1.73_M2}
GLUCOSE SERPL-MCNC: 92 MG/DL (ref 70–99)
HCT VFR BLD AUTO: 45.3 % (ref 40–53)
HGB BLD-MCNC: 15.7 G/DL (ref 13.3–17.7)
IMM GRANULOCYTES # BLD: 0 10E9/L (ref 0–0.4)
IMM GRANULOCYTES NFR BLD: 0.3 %
LYMPHOCYTES # BLD AUTO: 2.5 10E9/L (ref 0.8–5.3)
LYMPHOCYTES NFR BLD AUTO: 36.9 %
MCH RBC QN AUTO: 31.9 PG (ref 26.5–33)
MCHC RBC AUTO-ENTMCNC: 34.7 G/DL (ref 31.5–36.5)
MCV RBC AUTO: 92 FL (ref 78–100)
MONOCYTES # BLD AUTO: 0.6 10E9/L (ref 0–1.3)
MONOCYTES NFR BLD AUTO: 9.5 %
NEUTROPHILS # BLD AUTO: 3.5 10E9/L (ref 1.6–8.3)
NEUTROPHILS NFR BLD AUTO: 51.2 %
NRBC # BLD AUTO: 0 10*3/UL
NRBC BLD AUTO-RTO: 0 /100
PLATELET # BLD AUTO: 214 10E9/L (ref 150–450)
POTASSIUM SERPL-SCNC: 4.1 MMOL/L (ref 3.4–5.3)
PROT SERPL-MCNC: 7.7 G/DL (ref 6.8–8.8)
RBC # BLD AUTO: 4.92 10E12/L (ref 4.4–5.9)
SODIUM SERPL-SCNC: 138 MMOL/L (ref 133–144)
TSH SERPL DL<=0.005 MIU/L-ACNC: 3.12 MU/L (ref 0.4–4)
WBC # BLD AUTO: 6.8 10E9/L (ref 4–11)

## 2019-03-26 PROCEDURE — 85025 COMPLETE CBC W/AUTO DIFF WBC: CPT | Performed by: FAMILY MEDICINE

## 2019-03-26 PROCEDURE — 80053 COMPREHEN METABOLIC PANEL: CPT | Performed by: FAMILY MEDICINE

## 2019-03-26 PROCEDURE — 84443 ASSAY THYROID STIM HORMONE: CPT | Performed by: FAMILY MEDICINE

## 2019-03-26 PROCEDURE — 99214 OFFICE O/P EST MOD 30 MIN: CPT | Performed by: FAMILY MEDICINE

## 2019-03-26 PROCEDURE — 36415 COLL VENOUS BLD VENIPUNCTURE: CPT | Performed by: FAMILY MEDICINE

## 2019-03-26 ASSESSMENT — PAIN SCALES - GENERAL: PAINLEVEL: NO PAIN (0)

## 2019-03-26 ASSESSMENT — MIFFLIN-ST. JEOR: SCORE: 2104.26

## 2019-03-26 NOTE — PROGRESS NOTES
"  SUBJECTIVE:   Zaire Olivares is a 34 year old male who presents to clinic today for the following health issues:    Rash  Onset: 2 weeks    Description:   Location: face back of neck arms legs  Character: blotchy, red  Itching (Pruritis): YES    Progression of Symptoms:  worsening    Accompanying Signs & Symptoms:  Fever: no   Body aches or joint pain: no   Sore throat symptoms: no   Recent cold symptoms: no     History:   Previous similar rash: no     Precipitating factors:   Exposure to similar rash: no   New exposures: water softener went out out is when it started then fixed the water softener and it hasnt gone away   Recent travel: no     Alleviating factors:    PROBLEMS TO ADD ON...  Fatigue and foggy.     And look at finger    Zaire is here today for couple concerns.  First is the rash his face, neck, arm and legs that he has for 2 weeks.  It is overall about the same.  It is itchy and blotchy.  It comes and goes with no known triggering factor.  Never had this kind of rash before.  No exposure to any kind of rash.  No change in detergent, shampoo or lotion.  No unusual food.  No recent history of traveling or camping.  Seems to be worse in the morning.  Benadryl helps.  Usually lasted for 20 minutes.  No fever.  No cold symptoms.  No abdominal pain, nausea, vomiting, diarrhea or constipation.  Feel dizzy and \"foggy\" some time.  No acute change his vision. No history of thyroid problem.  Been feeling more tired.    Also has a swelling on the left ring finger.  This happened after he jammed the finger from playing sport.  Been there for about a month.  The swelling is not getting better.  Feels stiffs and is not able to bend his finger.  No pain.  No redness.  No numbness or tingling sensation.    Problem list and histories reviewed & adjusted, as indicated.  Additional history: as documented    No current outpatient medications on file.     Allergies   Allergen Reactions     No Known Allergies  " "      Reviewed and updated as needed this visit by clinical staff  Tobacco  Allergies  Meds  Soc Hx      Reviewed and updated as needed this visit by Provider         ROS:  Constitutional, HEENT, cardiovascular, pulmonary, gi and gu systems are negative, except as otherwise noted.    OBJECTIVE:     /80 (BP Location: Left arm, Patient Position: Sitting, Cuff Size: Adult Regular)   Pulse 74   Temp 98.2  F (36.8  C) (Temporal)   Resp 14   Ht 1.905 m (6' 3\")   Wt 108.9 kg (240 lb)   SpO2 99%   BMI 30.00 kg/m    Body mass index is 30 kg/m .  GENERAL: healthy, alert and no distress.  EYES: Eyes grossly normal to inspection, PERRL and conjunctivae and sclerae normal.  Non-icteric.  HENT: ear canals and TM's normal, nose and mouth without ulcers or lesions.  Nares are non-congested. Oropharynx is pink and moist. No tender with palpation to the sinuses.   NECK: no adenopathy, supple, no lymphadenopathy or thyromegaly.  RESP: lungs clear to auscultation - no rales, rhonchi or wheezes  CV: regular rate and rhythm, no murmur  ABDOMEN: soft, nontender, nondistended, no palpable masses or organomegaly with normal bowel sounds.  MS: no gross musculoskeletal defects noted, no edema.  All joints are in a rental range of motion.  All 4 extremities equally in strength.  The left fifth finger at the MIP joint was swollen but no redness.  Unable to flex more than 30 degrees.  Normal capillary refills.  SKIN: no suspicious lesions or rashes.  No rash was noted today.  NEURO: Normal strength and tone, mentation intact and speech normal.  No focal neurological deficit.    Diagnostic Test Results:  Results for orders placed or performed in visit on 03/26/19   Comprehensive metabolic panel (BMP + Alb, Alk Phos, ALT, AST, Total. Bili, TP)   Result Value Ref Range    Sodium 138 133 - 144 mmol/L    Potassium 4.1 3.4 - 5.3 mmol/L    Chloride 104 94 - 109 mmol/L    Carbon Dioxide 26 20 - 32 mmol/L    Anion Gap 8 3 - 14 mmol/L    " Glucose 92 70 - 99 mg/dL    Urea Nitrogen 17 7 - 30 mg/dL    Creatinine 1.01 0.66 - 1.25 mg/dL    GFR Estimate >90 >60 mL/min/[1.73_m2]    GFR Estimate If Black >90 >60 mL/min/[1.73_m2]    Calcium 8.9 8.5 - 10.1 mg/dL    Bilirubin Total 0.5 0.2 - 1.3 mg/dL    Albumin 3.9 3.4 - 5.0 g/dL    Protein Total 7.7 6.8 - 8.8 g/dL    Alkaline Phosphatase 45 40 - 150 U/L    ALT 23 0 - 70 U/L    AST 14 0 - 45 U/L   CBC with platelets and differential   Result Value Ref Range    WBC 6.8 4.0 - 11.0 10e9/L    RBC Count 4.92 4.4 - 5.9 10e12/L    Hemoglobin 15.7 13.3 - 17.7 g/dL    Hematocrit 45.3 40.0 - 53.0 %    MCV 92 78 - 100 fl    MCH 31.9 26.5 - 33.0 pg    MCHC 34.7 31.5 - 36.5 g/dL    RDW 12.1 10.0 - 15.0 %    Platelet Count 214 150 - 450 10e9/L    Diff Method Automated Method     % Neutrophils 51.2 %    % Lymphocytes 36.9 %    % Monocytes 9.5 %    % Eosinophils 1.5 %    % Basophils 0.6 %    % Immature Granulocytes 0.3 %    Nucleated RBCs 0 0 /100    Absolute Neutrophil 3.5 1.6 - 8.3 10e9/L    Absolute Lymphocytes 2.5 0.8 - 5.3 10e9/L    Absolute Monocytes 0.6 0.0 - 1.3 10e9/L    Absolute Basophils 0.0 0.0 - 0.2 10e9/L    Abs Immature Granulocytes 0.0 0 - 0.4 10e9/L    Absolute Nucleated RBC 0.0    TSH   Result Value Ref Range    TSH 3.12 0.40 - 4.00 mU/L       ASSESSMENT/PLAN:     1. Rash and nonspecific skin eruption  Has the for 2 weeks, on and off with no known triggering factor.  No rash on the physical exam today.  No fever.  Labs as ordered and they were normal.  Recommended Zyrtec daily.  Benadryl as needed.  Also recommend to make a food diary to see if he can identify if any particular food may cause symptoms.  Call in if not resolved the next couple weeks.  Also recommend to be generous with moisturizer.  Avoid hot showers.  He feels comfortable with the plan.    - Comprehensive metabolic panel (BMP + Alb, Alk Phos, ALT, AST, Total. Bili, TP)  - CBC with platelets and differential  - TSH    2. Swelling of left ring  finger  The physical exam suggests of a jersey finger.  Discussed with him about the nature of the condition.  It is affecting his daily activities.  Requested to be referred for further evaluation and management.  He has for over month.  Will refer him to orthopedic as per his request.      Edita Casillas Mai, MD  [facility]

## 2019-04-22 ENCOUNTER — TELEPHONE (OUTPATIENT)
Dept: FAMILY MEDICINE | Facility: CLINIC | Age: 34
End: 2019-04-22

## 2019-04-22 DIAGNOSIS — S63.639A: Primary | ICD-10-CM

## 2019-04-22 NOTE — TELEPHONE ENCOUNTER
Edita Tesfaye MD  [facility]             Please refer him to orthopedic for swollen/jersey finger.

## 2019-04-22 NOTE — TELEPHONE ENCOUNTER
Referral for Orthopedics is placed.  Left message for Zaire to return call and ask for [facility] to make this appointment for himself.

## 2019-05-15 ENCOUNTER — TELEPHONE (OUTPATIENT)
Dept: ORTHOPEDICS | Facility: CLINIC | Age: 34
End: 2019-05-15

## 2019-05-15 DIAGNOSIS — S63.639A: Primary | ICD-10-CM

## 2019-05-15 NOTE — TELEPHONE ENCOUNTER
I am putting the referral in.    Left message for patient that someone will call to schedule with hand surgery.    I am leaving his apt until I make sure he is scheduled.      Kathe/MEEK

## 2019-05-15 NOTE — TELEPHONE ENCOUNTER
----- Message from César Rashid, DO sent at 5/15/2019  7:13 AM CDT -----  See hand.    ----- Message -----  From: Art Torres  Sent: 5/14/2019   3:51 PM  To: César Rashid, DO    Will you see him for Jersey finger or refer to hand surgeon?    art

## 2019-05-28 ENCOUNTER — OFFICE VISIT (OUTPATIENT)
Dept: ORTHOPEDICS | Facility: CLINIC | Age: 34
End: 2019-05-28
Attending: ORTHOPAEDIC SURGERY
Payer: COMMERCIAL

## 2019-05-28 ENCOUNTER — PRE VISIT (OUTPATIENT)
Dept: ORTHOPEDICS | Facility: CLINIC | Age: 34
End: 2019-05-28

## 2019-05-28 ENCOUNTER — ANCILLARY PROCEDURE (OUTPATIENT)
Dept: GENERAL RADIOLOGY | Facility: CLINIC | Age: 34
End: 2019-05-28
Attending: PLASTIC SURGERY
Payer: COMMERCIAL

## 2019-05-28 VITALS — WEIGHT: 240 LBS | BODY MASS INDEX: 29.84 KG/M2 | HEIGHT: 75 IN

## 2019-05-28 DIAGNOSIS — S62.645S: Primary | ICD-10-CM

## 2019-05-28 DIAGNOSIS — S69.92XA FINGER INJURY, LEFT, INITIAL ENCOUNTER: Primary | ICD-10-CM

## 2019-05-28 DIAGNOSIS — S69.92XA FINGER INJURY, LEFT, INITIAL ENCOUNTER: ICD-10-CM

## 2019-05-28 PROCEDURE — 99202 OFFICE O/P NEW SF 15 MIN: CPT | Performed by: PLASTIC SURGERY

## 2019-05-28 PROCEDURE — 73140 X-RAY EXAM OF FINGER(S): CPT | Mod: LT | Performed by: RADIOLOGY

## 2019-05-28 ASSESSMENT — MIFFLIN-ST. JEOR: SCORE: 2104.26

## 2019-05-28 NOTE — PROGRESS NOTES
CONSULT NOTE      REFERRING PHYSICIAN:  César Rashid MD      PRESENTING COMPLAINT:  Consultation for injured left ring finger.      HISTORY OF PRESENTING COMPLAINT:  Mr. Olivares is 34 years old.  He is right-hand dominant.  While playing basketball 2 months ago, he was hit in the left ring finger which caused pain and swelling.  He did not have it looked at, just iced it and allowed it to just heal on its own.  Over the last couple of months, his pain is about 1 to 2/10.  He has some swelling and is here to have it looked at.  He has jammed this finger multiple times playing basketball in the last 10-20 years.  Never had it treated, per se.  Does not have any other symptoms.      PAST MEDICAL HISTORY:  Nil.      PAST SURGICAL HISTORY:  Nil.      MEDICATIONS:  Nil.      ALLERGIES:  Nil.      SOCIAL HISTORY:  He is a .  Lives in [place].  Does not smoke, drinks socially.      REVIEW OF SYSTEMS:  Denies chest pain, shortness of breath, MI, CVA, DVT and PE.      PHYSICAL EXAMINATION:  Vital signs are stable.  He is afebrile, in no obvious distress.  On examination of his left ring finger, the PIP joint area has a bit of swelling.  He has good range of motion, complete extension, almost full flexion.  Minimal tenderness.  He has no scissoring or rotational deformity.  X-ray shows an old fracture and some bony fragments.      ASSESSMENT AND PLAN:  Based on the above findings, a diagnosis of a healed fractured left ring finger proximal phalanx was made.  Currently, he has excellent range of motion considering his injury and some swelling.  In my opinion, range of motion and movement is key for this joint.  I would not recommend any surgery at this moment in time.  I have advised to just give it time, and hopefully, the pain should improve.  I did educate that in time he may develop arthritis of the joint.  All questions were answered.  I will see him on a p.r.n. basis.        Total time spent with patient  was 20 minutes, more than half was counseling.      cc:   César Rashid MD   [address] [place]  [address]

## 2019-05-28 NOTE — PATIENT INSTRUCTIONS
Thanks for coming today.  [facility]  [address] [place] [address]    To schedule future appointments in Ortho Clinic, you may call [phone].    To schedule ordered imaging by your provider:   Call Central Imaging [phone]    To schedule an injection ordered by your provider:  Call Central Imaging Injection scheduling line: [phone]  [facility] available online at:  [email or web address]    Please call if any further questions or concerns ([phone]).  Clinic hours 8 am to 5 pm.    Return to clinic (call) if symptoms worsen or fail to improve.

## 2019-05-28 NOTE — LETTER
"    5/28/2019         RE: Zaire Olivares  [address] [facility] [address]        Dear Colleague,    Thank you for referring your patient, Zaire Olivares, to the [facility]. Please see a copy of my visit note below.    Zaire Olivares's chief complaint for this visit includes:  Chief Complaint   Patient presents with     Finger     Left ring finger injury reoccurance over several years     PCP: Luis Eddy    Referring Provider:  César Rashid DO  [address] [place] [address]     1.905 m (6' 3\")   Wt 108.9 kg (240 lb)   BMI 30.00 kg/m     Data Unavailable         CONSULT NOTE      REFERRING PHYSICIAN:  César Rashid MD      PRESENTING COMPLAINT:  Consultation for injured left ring finger.      HISTORY OF PRESENTING COMPLAINT:  Mr. Olivares is 34 years old.  He is right-hand dominant.  While playing basketball 2 months ago, he was hit in the left ring finger which caused pain and swelling.  He did not have it looked at, just iced it and allowed it to just heal on its own.  Over the last couple of months, his pain is about 1 to 2/10.  He has some swelling and is here to have it looked at.  He has jammed this finger multiple times playing basketball in the last 10-20 years.  Never had it treated, per se.  Does not have any other symptoms.      PAST MEDICAL HISTORY:  Nil.      PAST SURGICAL HISTORY:  Nil.      MEDICATIONS:  Nil.      ALLERGIES:  Nil.      SOCIAL HISTORY:  He is a .  Lives in [place].  Does not smoke, drinks socially.      REVIEW OF SYSTEMS:  Denies chest pain, shortness of breath, MI, CVA, DVT and PE.      PHYSICAL EXAMINATION:  Vital signs are stable.  He is afebrile, in no obvious distress.  On examination of his left ring finger, the PIP joint area has a bit of swelling.  He has good range of motion, complete extension, almost full flexion.  Minimal tenderness.  He has no scissoring or rotational deformity.  X-ray shows " an old fracture and some bony fragments.      ASSESSMENT AND PLAN:  Based on the above findings, a diagnosis of a healed fractured left ring finger proximal phalanx was made.  Currently, he has excellent range of motion considering his injury and some swelling.  In my opinion, range of motion and movement is key for this joint.  I would not recommend any surgery at this moment in time.  I have advised to just give it time, and hopefully, the pain should improve.  I did educate that in time he may develop arthritis of the joint.  All questions were answered.  I will see him on a p.r.n. basis.        Total time spent with patient was 20 minutes, more than half was counseling.      cc:   César Rashid MD   [address] [place]  [address]         Again, thank you for allowing me to participate in the care of your patient.        Sincerely,        QUINCY Yates MD

## 2019-05-28 NOTE — PROGRESS NOTES
"Zaire Olivares's chief complaint for this visit includes:  Chief Complaint   Patient presents with     Finger     Left ring finger injury reoccurance over several years     PCP: Luis Eddy    Referring Provider:  César Rashid DO  [address] [place] [address]    Ht 1.905 m (6' 3\")   Wt 108.9 kg (240 lb)   BMI 30.00 kg/m    Data Unavailable       "

## 2019-06-10 ENCOUNTER — MYC MEDICAL ADVICE (OUTPATIENT)
Dept: FAMILY MEDICINE | Facility: CLINIC | Age: 34
End: 2019-06-10

## 2019-06-19 ENCOUNTER — OFFICE VISIT (OUTPATIENT)
Dept: FAMILY MEDICINE | Facility: CLINIC | Age: 34
End: 2019-06-19
Payer: COMMERCIAL

## 2019-06-19 VITALS
SYSTOLIC BLOOD PRESSURE: 108 MMHG | DIASTOLIC BLOOD PRESSURE: 68 MMHG | BODY MASS INDEX: 29.77 KG/M2 | TEMPERATURE: 97.8 F | RESPIRATION RATE: 14 BRPM | OXYGEN SATURATION: 97 % | HEART RATE: 65 BPM | WEIGHT: 238.2 LBS

## 2019-06-19 DIAGNOSIS — L50.3 DERMATOGRAPHIC URTICARIA: Primary | ICD-10-CM

## 2019-06-19 PROCEDURE — 99213 OFFICE O/P EST LOW 20 MIN: CPT | Performed by: FAMILY MEDICINE

## 2019-06-19 NOTE — PROGRESS NOTES
Subjective     Zaire Olivares is a 34 year old male who presents to clinic today for the following health issues:    HPI   Rash  Onset: x couple months    Description:   Location: forehead, back, neck, chest  Character: round, blotchy, red, warm  Itching (Pruritis): YES- on and off    Progression of Symptoms:  same    Accompanying Signs & Symptoms:  Fever: no   Body aches or joint pain: no   Sore throat symptoms: no   Recent cold symptoms: no     History:   Previous similar rash: no     Precipitating factors:   Exposure to similar rash: no   New exposures: None   Recent travel: no     Alleviating factors:  nothing    Therapies Tried and outcome: over the counter allergy medication     SUBJECTIVE:  Zaire  is a 34 year old male who presents for: Onset of red welts with touching his skin.  He towels his forehead off in the morning it turns red he can make streaks on his skin with his fingernails.  Heat brings it on.  This is just been going on for couple of months.  Nothing new in his environment.  There is water conditioner went out he thought it was may be that but he put in a new one and he continues on.  He has no known allergies.  He is not taking any medications or herbal medications.  He has taken Claritin and it does help.    History reviewed. No pertinent past medical history.  Past Surgical History:   Procedure Laterality Date     VASECTOMY  2/5/2016     Social History     Tobacco Use     Smoking status: Never Smoker     Smokeless tobacco: Never Used   Substance Use Topics     Alcohol use: Yes     Alcohol/week: 0.0 oz     Comment: weekend     No current outpatient medications on file.       REVIEW OF SYSTEMS:   5 point ROS negative except as noted above in HPI, including Gen., Resp, CV, GI &  system review.     OBJECTIVE:  Vitals: /68   Pulse 65   Temp 97.8  F (36.6  C) (Temporal)   Resp 14   Wt 108 kg (238 lb 3.2 oz)   SpO2 97%   BMI 29.77 kg/m    BMI= Body mass index is 29.77 kg/m .  Is alert  appears in no distress.  Throat is clear lungs are clear.  Heart regular rhythm.  He demonstrates this on his forehead develops a red welt after rubbing it and then it disappeared within minutes.    ASSESSMENT:  Dermatographic urticaria    PLAN:  Recommended Zyrtec or Claritin twice a day.  Avoid really hot showers and hot tubs.  If not disappearing into the 4 weeks may consider appointment with allergy.    Weight management plan: Discussed healthy diet and exercise guidelines    Luis Eddy MD  [facility]

## 2019-07-24 ENCOUNTER — OFFICE VISIT (OUTPATIENT)
Dept: PODIATRY | Facility: CLINIC | Age: 34
End: 2019-07-24
Payer: COMMERCIAL

## 2019-07-24 ENCOUNTER — ANCILLARY PROCEDURE (OUTPATIENT)
Dept: GENERAL RADIOLOGY | Facility: CLINIC | Age: 34
End: 2019-07-24
Attending: PODIATRIST
Payer: COMMERCIAL

## 2019-07-24 VITALS
HEIGHT: 75 IN | SYSTOLIC BLOOD PRESSURE: 104 MMHG | WEIGHT: 242 LBS | DIASTOLIC BLOOD PRESSURE: 72 MMHG | BODY MASS INDEX: 30.09 KG/M2

## 2019-07-24 DIAGNOSIS — M77.51 BURSITIS OF RIGHT ANKLE: ICD-10-CM

## 2019-07-24 DIAGNOSIS — Q66.70 PES CAVUS, CONGENITAL: ICD-10-CM

## 2019-07-24 DIAGNOSIS — M76.821 POSTERIOR TIBIAL TENDONITIS OF RIGHT LEG: Primary | ICD-10-CM

## 2019-07-24 PROCEDURE — 73630 X-RAY EXAM OF FOOT: CPT | Mod: TC

## 2019-07-24 PROCEDURE — 99213 OFFICE O/P EST LOW 20 MIN: CPT | Performed by: PODIATRIST

## 2019-07-24 RX ORDER — DICLOFENAC SODIUM 75 MG/1
75 TABLET, DELAYED RELEASE ORAL 2 TIMES DAILY
Qty: 60 TABLET | Refills: 1 | Status: SHIPPED | OUTPATIENT
Start: 2019-07-24 | End: 2023-04-27

## 2019-07-24 ASSESSMENT — MIFFLIN-ST. JEOR: SCORE: 2113.33

## 2019-07-24 ASSESSMENT — PAIN SCALES - GENERAL: PAINLEVEL: SEVERE PAIN (6)

## 2019-07-24 NOTE — PROGRESS NOTES
HPI:  Zaire Olivares is a 34 year old male who is seen in consultation at the request of self.    Pt presents for eval of:   (Onset, Location, L/R, Character, Treatments, Injury if yes)    XR Right foot today 7/24/2019     Onset 7/23/2019, medial Right foot pain, middle of night right foot started to throb, no injury noted. Presents today with slip on casual shoes.  Patient notes that over the summer months he has been wearing more flip-flops and crocs and when he started carrying canoes in preparation [place] this pain started.  He has been carrying the canoes in flip-flops and crocs for 1/2 miles.  Constant swelling, dull ache, Intermittent, sharp, throbbing pain 6  Ice, wears orthotics only while working    Works as a teacher at [employer].    Weight management plan: Patient was referred to their PCP to discuss a diet and exercise plan.     Patient to follow up with Primary Care provider regarding elevated blood pressure.    ROS:  10 point ROS neg other than the symptoms noted above in the HPI.    Patient Active Problem List   Diagnosis     CARDIOVASCULAR SCREENING; LDL GOAL LESS THAN 160     Plantar fasciitis     Pes cavus, congenital       PAST MEDICAL HISTORY: History reviewed. No pertinent past medical history.     PAST SURGICAL HISTORY:   Past Surgical History:   Procedure Laterality Date     VASECTOMY  2/5/2016        MEDICATIONS:   Current Outpatient Medications:      diclofenac (VOLTAREN) 75 MG EC tablet, Take 1 tablet (75 mg) by mouth 2 times daily, Disp: 60 tablet, Rfl: 1     ALLERGIES:    Allergies   Allergen Reactions     No Known Allergies         SOCIAL HISTORY:   Social History     Socioeconomic History     Marital status:      Spouse name: Not on file     Number of children: Not on file     Years of education: Not on file     Highest education level: Not on file   Occupational History     Not on file   Social Needs     Financial resource strain: Not on file     Food  "insecurity:     Worry: Not on file     Inability: Not on file     Transportation needs:     Medical: Not on file     Non-medical: Not on file   Tobacco Use     Smoking status: Never Smoker     Smokeless tobacco: Never Used   Substance and Sexual Activity     Alcohol use: Yes     Alcohol/week: 0.0 oz     Comment: weekend     Drug use: No     Sexual activity: Yes     Partners: Female     Birth control/protection: Surgical, Male Surgical     Comment: vasectomy   Lifestyle     Physical activity:     Days per week: Not on file     Minutes per session: Not on file     Stress: Not on file   Relationships     Social connections:     Talks on phone: Not on file     Gets together: Not on file     Attends Holiness service: Not on file     Active member of club or organization: Not on file     Attends meetings of clubs or organizations: Not on file     Relationship status: Not on file     Intimate partner violence:     Fear of current or ex partner: Not on file     Emotionally abused: Not on file     Physically abused: Not on file     Forced sexual activity: Not on file   Other Topics Concern     Parent/sibling w/ CABG, MI or angioplasty before 65F 55M? Not Asked   Social History Narrative     Not on file        FAMILY HISTORY:   Family History   Problem Relation Age of Onset     Heart Disease Father      Heart Disease Paternal Grandfather         EXAM:Vitals: /72 (BP Location: Left arm, Cuff Size: Adult Large)   Ht 1.905 m (6' 3\")   Wt 109.8 kg (242 lb)   BMI 30.25 kg/m    BMI= Body mass index is 30.25 kg/m .    General appearance: Patient is alert and fully cooperative with history & exam.  No sign of distress is noted during the visit.     Psychiatric: Affect is pleasant & appropriate.  Patient appears motivated to improve health.     Respiratory: Breathing is regular & unlabored while sitting.     HEENT: Hearing is intact to spoken word.  Speech is clear.  No gross evidence of visual impairment that would impact " ambulation.     Vascular: DP & PT pulses are intact & regular bilaterally.  No significant edema or varicosities noted.  CFT and skin temperature is normal to both lower extremities.     Neurologic: Lower extremity sensation is intact to light touch.  No evidence of weakness or contracture in the lower extremities.  No evidence of neuropathy.    Dermatologic: Skin is intact to both lower extremities with adequate texture, turgor and tone about the integument.  No paronychia or evidence of soft tissue infection is noted.     Musculoskeletal: Patient is ambulatory without assistive device or brace.  High arch cavus foot type noted.  Discomfort is noted with firm palpation at the insertion of the posterior tibial tendon and mostly just anterior to the navicular prominence right greater than left.  The navicular is quite prominent bilateral.     ASSESSMENT:       ICD-10-CM    1. Posterior tibial tendonitis of right leg M76.821 diclofenac (VOLTAREN) 75 MG EC tablet   2. Pes cavus, congenital Q66.7 diclofenac (VOLTAREN) 75 MG EC tablet   3. Bursitis of right ankle M77.51         PLAN:  Reviewed patient's chart in [facility].      7/24/2019   Recommended discontinuing the crocs in flip-flops and no caring canoes or anything more than 30 pounds over the next couple of weeks.  He has custom molded orthotics, 3 pairs in fact, he was instructed to use them.  Begin oral anti-inflammatory over the next couple of weeks.  Letter was dispensed for not to lift more than 30 pounds and no canoes over the next 2 weeks.  Follow-up in the next 2 to 3 weeks if this remains symptomatic otherwise as needed.  All questions were answered.    Bar Ferrell DPM

## 2019-07-24 NOTE — PATIENT INSTRUCTIONS
Reliable shoe stores: To maximize your experience and provide the best possible fit.  Be sure to show them your foot concerns and tell them Dr. Ferrell sent you.      Stores listed in bold have only athletic shoes, and stores that are not bold are mostly casual or variety of shoes    [employer]  [address] [place] [address]  [phone]    [employer]  [address] [place] [address]  [phone]     [employer] [facility] [place]  [address] [place] [address]  [phone]    [employer]  [address] [facility]  [facility] [address]  [phone]    [employer]  [address] [place], [place] [address] [phone]    [employer]  [address] [place] [address]  [phone]                         [employer] Locations:     [address] [place] [address]   [phone]     [address] [place] [address] [address] [place] [address]   [phone]     [address] [place] [address]   [phone]     [address] [facility] [place] [address]   [phone]     [address] [place] [place] [address]   [phone]     [address] [place] [address]   [phone]     [address] [facility] [address]   [place] [address]   [phone]     [address] [facility]   [place] [address]   [phone]             How to find reasonable shoes          The correct width    Correct Fitting    Correct Length      Foot Distortion    Posture Distortion                          Torsional Rigidity      Grasp behind the heel and underneath the foot and twist      Bad    Excessive torsion/twist in midfoot     Less torsion/twist in midfoot is better                   Heel Counter Rigidity      Grasp just above   midsole and squeeze      Bad    Soft heel counter      Good    Rigid Heel Counter      Flexion Rigidity      Grasp shoe and bend from forefoot to rearfoot              Reliable shoe stores: To maximize your experience  and provide the best possible fit.  Be sure to show them your foot concerns and tell them Dr. Ferrell sent you.      Stores listed in bold have only athletic shoes, and stores that are not bold are mostly casual or variety of shoes    [employer]  [address] [place] [address]  [phone]    [employer] [facility] [place]  [address] [place] [address]  [phone]    [employer] [facility] [place]  [address] [place] [address]  [phone]    [employer]  [address]  [facility] [address]  [phone]    [employer]  [address] [place], [place] [address] [phone]    [employer]  [address] [place] [address]  [phone]                         [employer] Locations:     [address] [place] [address]   [phone]     [address] [place] [address] [address] [place] [address]   [phone]     [address] [place] [address]   [phone]     [address] [facility] [place] [address]   [phone]     [address] [place] [address]   [phone]     [address] [place] [address]   [phone]     [address] [facility] [address]   [place] [address]   [phone]     [address] [facility]. [address]   [place] [address]   [phone]             How to find reasonable shoes          The correct width    Correct Fitting    Correct Length      Foot Distortion    Posture Distortion                          Torsional Rigidity      Grasp behind the heel and underneath the foot and twist      Bad    Excessive torsion/twist in midfoot     Less torsion/twist in midfoot is better                   Heel Counter Rigidity      Grasp just above   midsole and squeeze      Bad    Soft heel counter      Good    Rigid Heel Counter      Flexion Rigidity      Grasp shoe and bend from forefoot to rearfoot

## 2019-07-24 NOTE — LETTER
[address] [address]  [phone]    2019      RE:  Zaire Olivares  : 1985      To whom it may concern:    This patient must not lift or carry more than 30 pounds until all pain and swelling is resolved, which will likely take 2-4 weeks.  No lifting or carrying canoes.     Sincerely,          Bar Ferrell DPM

## 2019-07-24 NOTE — LETTER
7/24/2019         RE: Zaire Olivares  [address] [facility] [address]        Dear Colleague,    Thank you for referring your patient, Zaire Olivares, to the [facility]. Please see a copy of my visit note below.    HPI:  Zaire Olivares is a 34 year old male who is seen in consultation at the request of self.    Pt presents for eval of:   (Onset, Location, L/R, Character, Treatments, Injury if yes)    XR Right foot today 7/24/2019     Onset 7/23/2019, medial Right foot pain, middle of night right foot started to throb, no injury noted. Presents today with slip on casual shoes.  Patient notes that over the summer months he has been wearing more flip-flops and crocs and when he started carrying canoes in preparation [place] this pain started.  He has been carrying the canoes in flip-flops and crocs for 1/2 miles.  Constant swelling, dull ache, Intermittent, sharp, throbbing pain 6  Ice, wears orthotics only while working    Works as a teacher at [employer].    Weight management plan: Patient was referred to their PCP to discuss a diet and exercise plan.     Patient to follow up with Primary Care provider regarding elevated blood pressure.    ROS:  10 point ROS neg other than the symptoms noted above in the HPI.    Patient Active Problem List   Diagnosis     CARDIOVASCULAR SCREENING; LDL GOAL LESS THAN 160     Plantar fasciitis     Pes cavus, congenital       PAST MEDICAL HISTORY: History reviewed. No pertinent past medical history.     PAST SURGICAL HISTORY:   Past Surgical History:   Procedure Laterality Date     VASECTOMY  2/5/2016        MEDICATIONS:   Current Outpatient Medications:      diclofenac (VOLTAREN) 75 MG EC tablet, Take 1 tablet (75 mg) by mouth 2 times daily, Disp: 60 tablet, Rfl: 1     ALLERGIES:    Allergies   Allergen Reactions     No Known Allergies         SOCIAL HISTORY:   Social History     Socioeconomic History     Marital status:      Spouse  "name: Not on file     Number of children: Not on file     Years of education: Not on file     Highest education level: Not on file   Occupational History     Not on file   Social Needs     Financial resource strain: Not on file     Food insecurity:     Worry: Not on file     Inability: Not on file     Transportation needs:     Medical: Not on file     Non-medical: Not on file   Tobacco Use     Smoking status: Never Smoker     Smokeless tobacco: Never Used   Substance and Sexual Activity     Alcohol use: Yes     Alcohol/week: 0.0 oz     Comment: weekend     Drug use: No     Sexual activity: Yes     Partners: Female     Birth control/protection: Surgical, Male Surgical     Comment: vasectomy   Lifestyle     Physical activity:     Days per week: Not on file     Minutes per session: Not on file     Stress: Not on file   Relationships     Social connections:     Talks on phone: Not on file     Gets together: Not on file     Attends Restorationism service: Not on file     Active member of club or organization: Not on file     Attends meetings of clubs or organizations: Not on file     Relationship status: Not on file     Intimate partner violence:     Fear of current or ex partner: Not on file     Emotionally abused: Not on file     Physically abused: Not on file     Forced sexual activity: Not on file   Other Topics Concern     Parent/sibling w/ CABG, MI or angioplasty before 65F 55M? Not Asked   Social History Narrative     Not on file        FAMILY HISTORY:   Family History   Problem Relation Age of Onset     Heart Disease Father      Heart Disease Paternal Grandfather         EXAM:Vitals: /72 (BP Location: Left arm, Cuff Size: Adult Large)   Ht 1.905 m (6' 3\")   Wt 109.8 kg (242 lb)   BMI 30.25 kg/m     BMI= Body mass index is 30.25 kg/m .    General appearance: Patient is alert and fully cooperative with history & exam.  No sign of distress is noted during the visit.     Psychiatric: Affect is pleasant & " appropriate.  Patient appears motivated to improve health.     Respiratory: Breathing is regular & unlabored while sitting.     HEENT: Hearing is intact to spoken word.  Speech is clear.  No gross evidence of visual impairment that would impact ambulation.     Vascular: DP & PT pulses are intact & regular bilaterally.  No significant edema or varicosities noted.  CFT and skin temperature is normal to both lower extremities.     Neurologic: Lower extremity sensation is intact to light touch.  No evidence of weakness or contracture in the lower extremities.  No evidence of neuropathy.    Dermatologic: Skin is intact to both lower extremities with adequate texture, turgor and tone about the integument.  No paronychia or evidence of soft tissue infection is noted.     Musculoskeletal: Patient is ambulatory without assistive device or brace.  High arch cavus foot type noted.  Discomfort is noted with firm palpation at the insertion of the posterior tibial tendon and mostly just anterior to the navicular prominence right greater than left.  The navicular is quite prominent bilateral.     ASSESSMENT:       ICD-10-CM    1. Posterior tibial tendonitis of right leg M76.821 diclofenac (VOLTAREN) 75 MG EC tablet   2. Pes cavus, congenital Q66.7 diclofenac (VOLTAREN) 75 MG EC tablet   3. Bursitis of right ankle M77.51         PLAN:  Reviewed patient's chart in [facility].      7/24/2019   Recommended discontinuing the crocs in flip-flops and no caring canoes or anything more than 30 pounds over the next couple of weeks.  He has custom molded orthotics, 3 pairs in fact, he was instructed to use them.  Begin oral anti-inflammatory over the next couple of weeks.  Letter was dispensed for not to lift more than 30 pounds and no canoes over the next 2 weeks.  Follow-up in the next 2 to 3 weeks if this remains symptomatic otherwise as needed.  All questions were answered.    Bar Ferrell DPM      Again, thank you for allowing me to  participate in the care of your patient.        Sincerely,        Bar Ferrell DPM

## 2019-08-22 ENCOUNTER — TELEPHONE (OUTPATIENT)
Dept: FAMILY MEDICINE | Facility: CLINIC | Age: 34
End: 2019-08-22

## 2019-08-22 DIAGNOSIS — L50.3 DERMATOGRAPHIA: Primary | ICD-10-CM

## 2019-09-03 ENCOUNTER — OFFICE VISIT (OUTPATIENT)
Dept: ALLERGY | Facility: OTHER | Age: 34
End: 2019-09-03
Payer: COMMERCIAL

## 2019-09-03 VITALS
DIASTOLIC BLOOD PRESSURE: 72 MMHG | BODY MASS INDEX: 30.34 KG/M2 | SYSTOLIC BLOOD PRESSURE: 120 MMHG | HEIGHT: 75 IN | HEART RATE: 59 BPM | WEIGHT: 244 LBS | TEMPERATURE: 98.1 F | OXYGEN SATURATION: 98 %

## 2019-09-03 DIAGNOSIS — L50.3 SYMPTOMATIC DERMOGRAPHISM: ICD-10-CM

## 2019-09-03 PROCEDURE — 99203 OFFICE O/P NEW LOW 30 MIN: CPT | Performed by: ALLERGY & IMMUNOLOGY

## 2019-09-03 RX ORDER — CETIRIZINE HYDROCHLORIDE 10 MG/1
10 TABLET ORAL DAILY
COMMUNITY

## 2019-09-03 ASSESSMENT — PAIN SCALES - GENERAL: PAINLEVEL: NO PAIN (0)

## 2019-09-03 ASSESSMENT — MIFFLIN-ST. JEOR: SCORE: 2122.41

## 2019-09-03 NOTE — PATIENT INSTRUCTIONS
Allergy Staff Appt Hours Shot Hours Locations    Physician     Kyree Segovia DO       Support Staff     BUD Kramer, KD  Tuesday:        [place] 7-4:20     Wednesday:        [place]: 7-5 Thursday:                    [place] 7-6:40     Friday:  [place]  7-2:40   [place]        Thursday: 1-5:50        Friday: 7-10:50     [place]        Tuesday: 7- 3:20        Wednesday: 7-4:20     [place] Monday: 7-4:20        Tuesday: 1-6:20         [facility]  [address] [place] [address]  Appt Line: [phone]  Allergy RN:  [phone]    [facility]  [address] [place] [address]  Appt Line: [phone]  Allergy RN:  [phone]       Important Scheduling Information  Aspirin Desensitization: Appt will last 2 clinic days. Please call the Allergy RN line for your clinic to schedule. Discontinue antihistamines 7 days prior to the appointment.     Food Challenges: Appt will last 3-4 hours. Please call the Allergy RN line for your clinic to schedule. Discontinue antihistamines 7 days prior to the appointment.     Penicillin Testing: Appt will last 2-3 hours. Please call the Allergy RN line for your clinic to schedule. Discontinue antihistamines 7 days prior to the appointment.     Skin Testing: Appt will about 40 minutes. Call the appointment line for your clinic to schedule. Discontinue antihistamines 7 days prior to the appointment.     Venom Testing: Appt will last 2-3 hours. Please call the Allergy RN line for your clinic to schedule. Discontinue antihistamines 7 days prior to the appointment.     Thank you for trusting us with your Allergy, Asthma, and Immunology care. Please feel free to contact us with any questions or concerns you may have.      - Zyrtec (cetirizine) 10mg by mouth twice daily. If symptoms persist increase to 20mg by mouth twice daily.   - Return in 6 months

## 2019-09-03 NOTE — PROGRESS NOTES
Zaire Olivares is a 34 year old White male with no significant previous medical historyZaire Olivares is being seen today for evaluation of chronic hives. The patient is being seen in consultation at the request of Dr. Surjit MD.     Patient reports that beginning in April 2019 he began to notice that when he itches at his skin he will develop erythematous welts.  He thinks these welts once a develop are itchy.  This is become less noticeable over time.  Possibly secondary to him taking Zyrtec.  He occasionally will have itchy spots without erythema that when he itches he will develop welts.  He wakes up with flat blotches on his face in the morning.  After taking a shower or toweling off he will have erythematous welts present.  He does not think that heat or cold cause him to have welts.  Exercise does not cause him to have welts.  Has been on Zyrtec 10 mg by mouth daily.  As previously noted he thinks this has been beneficial.  No history of hives at other times.  He has never had this issue before.  No angioedema.  Otherwise healthy.    ENVIRONMENTAL HISTORY: [place] is heated with a forced air. They do have central air conditioning. The patient's bedroom is furnished with carpeting in bedroom.  Pets inside the house include 2 dog(s). There is no history of cockroach or mice infestation. There is/are 0 smokers in the house.  The house does not have a damp basement.       History reviewed. No pertinent past medical history.  Family History   Problem Relation Age of Onset     Heart Disease Father      Heart Disease Paternal Grandfather      Past Surgical History:   Procedure Laterality Date     VASECTOMY  2/5/2016       REVIEW OF SYSTEMS:  General: negative for weight gain. negative for weight loss. negative for changes in sleep.   Ears: negative for fullness. negative for hearing loss. negative for dizziness.   Nose: negative for snoring.negative for  changes in smell. negative for drainage.   Eyes: negative for eye watering. negative for eye itching. negative for vision changes. negative for eye redness.  Throat: negative for hoarseness. negative for sore throat. negative for trouble swallowing.   Lungs: negative for shortness of breath.negative for wheezing. negative for sputum production.   Cardiovascular: negative for chest pain. negative for swelling of ankles. negative for fast or irregular heartbeat.   Gastrointestinal: negative for nausea. negative for heartburn. negative for acid reflux.   Musculoskeletal: negative for joint pain. negative for joint stiffness. negative for joint swelling.   Neurologic: negative for seizures. negative for fainting. negative for weakness.   Psychiatric: negative for changes in mood. negative for anxiety.   Endocrine: negative for cold intolerance. negative for heat intolerance. negative for tremors.   Lymphatic: negative for lower extremity swelling. negative for lymph node swelling.   Hematologic: negative for easy bruising. negative for easy bleeding.  Integumentary: negative for rash. negative for scaling. negative for nail changes.       Current Outpatient Medications:      cetirizine (ZYRTEC) 10 MG tablet, Take 10 mg by mouth daily, Disp: , Rfl:      diclofenac (VOLTAREN) 75 MG EC tablet, Take 1 tablet (75 mg) by mouth 2 times daily (Patient not taking: Reported on 9/3/2019), Disp: 60 tablet, Rfl: 1  Immunization History   Administered Date(s) Administered     FLU 6-35 months 10/28/2009     Influenza (IIV3) PF 10/06/2010, 10/05/2011, 10/03/2012, 10/02/2013, 10/01/2014     Influenza Vaccine IM > 6 months Valent IIV4 09/30/2015, 09/27/2017     TDAP Vaccine (Adacel) 07/23/2008     Allergies   Allergen Reactions     No Known Allergies          EXAM:   Constitutional:  Appears well-developed and well-nourished. No distress.   HEENT:   Head: Normocephalic.   Right Ear: External ear normal. TM normal  Left Ear: External ear  normal. TM normal  Mouth/Throat: No oropharyngeal exudate present.   No cobblestoning of posterior oropharynx.   Nasal tissue pink and normal appearing.  No rhinorrhea noted.    Eyes: Conjunctivae are non-erythematous   No maxillary or frontal sinus tenderness to palpation.   Cardiovascular: Normal rate, regular rhythm and normal heart sounds. Exam reveals no gallop and no friction rub.   No murmur heard.  Respiratory: Effort normal and breath sounds normal. No respiratory distress. No wheezes. No rales.   Musculoskeletal: Normal range of motion.   Lymphadenopathy:   No cervical adenopathy.   No lower extremity edema.   Neuro: Oriented to person, place, and time.  Skin: Skin is warm and dry. No rash noted.   Skin stroked with tongue blade and raised and erythematous welt noted at the site.   Psychiatric: Normal mood and affect.     Nursing note and vitals reviewed.    ASSESSMENT/PLAN:  Problem List Items Addressed This Visit        Musculoskeletal and Integumentary    Symptomatic dermographism     With pressure/rubing/scratching development of welts. He thinks these welts are itchy but less noticeable now on Zyrtec. Wakes up with blotches on face that are non itchy. Positive testing with tongue blade in that generated welt at site of skin being stroked.     - Increase Zyrtec to 10mg PO bid. If still symptomatic increase to 20mg PO bid.   - If continues to have symptoms consider h2 blocker or Xolair.   - Return in 6 months.          Relevant Medications    cetirizine (ZYRTEC) 10 MG tablet          Chart documentation with Dragon Voice recognition Software. Although reviewed after completion, some words and grammatical errors may remain.    Kyree Segovia DO FAAAAI  Allergy/Immunology  [facility], [place]

## 2019-09-03 NOTE — ASSESSMENT & PLAN NOTE
With pressure/rubing/scratching development of welts. He thinks these welts are itchy but less noticeable now on Zyrtec. Wakes up with blotches on face that are non itchy. Positive testing with tongue blade in that generated welt at site of skin being stroked.     - Increase Zyrtec to 10mg PO bid. If still symptomatic increase to 20mg PO bid.   - If continues to have symptoms consider h2 blocker or Xolair.   - Return in 6 months.

## 2019-09-03 NOTE — LETTER
9/3/2019         RE: Zaire Olivares  [address] [facility] [id number]        Dear Colleague,    Thank you for referring your patient, Zaire Olivares, to the [facility]. Please see a copy of my visit note below.    Zaire Olivares is a 34 year old White male with no significant previous medical historyZaire Olivares is being seen today for evaluation of chronic hives. The patient is being seen in consultation at the request of Dr. Surjit MD.     Patient reports that beginning in April 2019 he began to notice that when he itches at his skin he will develop erythematous welts.  He thinks these welts once a develop are itchy.  This is become less noticeable over time.  Possibly secondary to him taking Zyrtec.  He occasionally will have itchy spots without erythema that when he itches he will develop welts.  He wakes up with flat blotches on his face in the morning.  After taking a shower or toweling off he will have erythematous welts present.  He does not think that heat or cold cause him to have welts.  Exercise does not cause him to have welts.  Has been on Zyrtec 10 mg by mouth daily.  As previously noted he thinks this has been beneficial.  No history of hives at other times.  He has never had this issue before.  No angioedema.  Otherwise healthy.    ENVIRONMENTAL HISTORY: [place] The home is heated with a forced air. They do have central air conditioning. The patient's bedroom is furnished with carpeting in bedroom.  Pets inside the house include 2 dog(s). There is no history of cockroach or mice infestation. There is/are 0 smokers in the house.  The house does not have a damp basement.       History reviewed. No pertinent past medical history.  Family History   Problem Relation Age of Onset     Heart Disease Father      Heart Disease Paternal Grandfather      Past Surgical History:   Procedure Laterality Date     VASECTOMY  2/5/2016        REVIEW OF SYSTEMS:  General: negative for weight gain. negative for weight loss. negative for changes in sleep.   Ears: negative for fullness. negative for hearing loss. negative for dizziness.   Nose: negative for snoring.negative for changes in smell. negative for drainage.   Eyes: negative for eye watering. negative for eye itching. negative for vision changes. negative for eye redness.  Throat: negative for hoarseness. negative for sore throat. negative for trouble swallowing.   Lungs: negative for shortness of breath.negative for wheezing. negative for sputum production.   Cardiovascular: negative for chest pain. negative for swelling of ankles. negative for fast or irregular heartbeat.   Gastrointestinal: negative for nausea. negative for heartburn. negative for acid reflux.   Musculoskeletal: negative for joint pain. negative for joint stiffness. negative for joint swelling.   Neurologic: negative for seizures. negative for fainting. negative for weakness.   Psychiatric: negative for changes in mood. negative for anxiety.   Endocrine: negative for cold intolerance. negative for heat intolerance. negative for tremors.   Lymphatic: negative for lower extremity swelling. negative for lymph node swelling.   Hematologic: negative for easy bruising. negative for easy bleeding.  Integumentary: negative for rash. negative for scaling. negative for nail changes.       Current Outpatient Medications:      cetirizine (ZYRTEC) 10 MG tablet, Take 10 mg by mouth daily, Disp: , Rfl:      diclofenac (VOLTAREN) 75 MG EC tablet, Take 1 tablet (75 mg) by mouth 2 times daily (Patient not taking: Reported on 9/3/2019), Disp: 60 tablet, Rfl: 1  Immunization History   Administered Date(s) Administered     FLU 6-35 months 10/28/2009     Influenza (IIV3) PF 10/06/2010, 10/05/2011, 10/03/2012, 10/02/2013, 10/01/2014     Influenza Vaccine IM > 6 months Valent IIV4 09/30/2015, 09/27/2017     TDAP Vaccine (Adacel) 07/23/2008      Allergies   Allergen Reactions     No Known Allergies          EXAM:   Constitutional:  Appears well-developed and well-nourished. No distress.   HEENT:   Head: Normocephalic.   Right Ear: External ear normal. TM normal  Left Ear: External ear normal. TM normal  Mouth/Throat: No oropharyngeal exudate present.   No cobblestoning of posterior oropharynx.   Nasal tissue pink and normal appearing.  No rhinorrhea noted.    Eyes: Conjunctivae are non-erythematous   No maxillary or frontal sinus tenderness to palpation.   Cardiovascular: Normal rate, regular rhythm and normal heart sounds. Exam reveals no gallop and no friction rub.   No murmur heard.  Respiratory: Effort normal and breath sounds normal. No respiratory distress. No wheezes. No rales.   Musculoskeletal: Normal range of motion.   Lymphadenopathy:   No cervical adenopathy.   No lower extremity edema.   Neuro: Oriented to person, place, and time.  Skin: Skin is warm and dry. No rash noted.   Skin stroked with tongue blade and raised and erythematous welt noted at the site.   Psychiatric: Normal mood and affect.     Nursing note and vitals reviewed.    ASSESSMENT/PLAN:  Problem List Items Addressed This Visit        Musculoskeletal and Integumentary    Symptomatic dermographism     With pressure/rubing/scratching development of welts. He thinks these welts are itchy but less noticeable now on Zyrtec. Wakes up with blotches on face that are non itchy. Positive testing with tongue blade in that generated welt at site of skin being stroked.     - Increase Zyrtec to 10mg PO bid. If still symptomatic increase to 20mg PO bid.   - If continues to have symptoms consider h2 blocker or Xolair.   - Return in 6 months.          Relevant Medications    cetirizine (ZYRTEC) 10 MG tablet          Chart documentation with Dragon Voice recognition Software. Although reviewed after completion, some words and grammatical errors may remain.    Kyree Segovia, DO  FAAAAI  Allergy/Immunology  [facility] and [place], [place]      Again, thank you for allowing me to participate in the care of your patient.        Sincerely,        Kyree Segovia, DO

## 2019-12-08 ENCOUNTER — HEALTH MAINTENANCE LETTER (OUTPATIENT)
Age: 34
End: 2019-12-08

## 2019-12-09 ENCOUNTER — MYC MEDICAL ADVICE (OUTPATIENT)
Dept: ALLERGY | Facility: OTHER | Age: 34
End: 2019-12-09

## 2019-12-18 ENCOUNTER — OFFICE VISIT (OUTPATIENT)
Dept: URGENT CARE | Facility: RETAIL CLINIC | Age: 34
End: 2019-12-18
Payer: COMMERCIAL

## 2019-12-18 VITALS
DIASTOLIC BLOOD PRESSURE: 84 MMHG | OXYGEN SATURATION: 98 % | HEART RATE: 71 BPM | TEMPERATURE: 98.5 F | SYSTOLIC BLOOD PRESSURE: 125 MMHG

## 2019-12-18 DIAGNOSIS — J02.9 ACUTE PHARYNGITIS, UNSPECIFIED ETIOLOGY: Primary | ICD-10-CM

## 2019-12-18 LAB — S PYO AG THROAT QL IA.RAPID: NORMAL

## 2019-12-18 PROCEDURE — 87081 CULTURE SCREEN ONLY: CPT | Performed by: NURSE PRACTITIONER

## 2019-12-18 PROCEDURE — 87880 STREP A ASSAY W/OPTIC: CPT | Mod: QW | Performed by: NURSE PRACTITIONER

## 2019-12-18 PROCEDURE — 99213 OFFICE O/P EST LOW 20 MIN: CPT | Performed by: NURSE PRACTITIONER

## 2019-12-18 ASSESSMENT — ENCOUNTER SYMPTOMS
ADENOPATHY: 0
SORE THROAT: 1
SLEEP DISTURBANCE: 0
COUGH: 0
DIAPHORESIS: 0
VOMITING: 0
APPETITE CHANGE: 0
ABDOMINAL PAIN: 0
SINUS PRESSURE: 0
MYALGIAS: 0
HEADACHES: 0
RHINORRHEA: 0
FEVER: 0
NAUSEA: 0
CHILLS: 0
FATIGUE: 0
TROUBLE SWALLOWING: 1

## 2019-12-18 NOTE — PROGRESS NOTES
Chief Complaint   Patient presents with     Pharyngitis     started today, exposed to strep     SUBJECTIVE:  Zaire Olivares is a 34 year old male presenting with a chief complaint of a sore throat for 1 day.  Course of illness: sudden onset and still present.  Severity: moderate  Treatment measures tried include: None tried.  Predisposing factors include: toddler just tested positive for strep 2 days ago. He has never had it before.    No past medical history on file.  cetirizine (ZYRTEC) 10 MG tablet, Take 10 mg by mouth daily  diclofenac (VOLTAREN) 75 MG EC tablet, Take 1 tablet (75 mg) by mouth 2 times daily (Patient not taking: Reported on 9/3/2019)    No current facility-administered medications on file prior to visit.     Social History     Tobacco Use     Smoking status: Never Smoker     Smokeless tobacco: Never Used   Substance Use Topics     Alcohol use: Yes     Alcohol/week: 0.0 standard drinks     Comment: weekend     Allergies   Allergen Reactions     No Known Allergies      Review of Systems   Constitutional: Negative for appetite change, chills, diaphoresis, fatigue and fever.   HENT: Positive for sore throat and trouble swallowing. Negative for congestion, ear pain, postnasal drip, rhinorrhea and sinus pressure.    Respiratory: Negative for cough.    Gastrointestinal: Negative for abdominal pain, nausea and vomiting.   Musculoskeletal: Negative for myalgias.   Skin: Negative for rash.   Neurological: Negative for headaches.   Hematological: Negative for adenopathy (anterior cervical).   Psychiatric/Behavioral: Negative for sleep disturbance.     OBJECTIVE:   /84   Pulse 71   Temp 98.5  F (36.9  C) (Oral)   SpO2 98%      Physical Exam  Vitals signs reviewed.   Constitutional:       Appearance: Normal appearance.   HENT:      Head: Normocephalic and atraumatic.      Nose: Nose normal.      Mouth/Throat:      Mouth: Mucous membranes are moist.      Pharynx: Posterior oropharyngeal erythema  present. No oropharyngeal exudate.   Neck:      Musculoskeletal: Normal range of motion and neck supple.   Cardiovascular:      Rate and Rhythm: Normal rate.   Pulmonary:      Effort: Pulmonary effort is normal.      Breath sounds: Normal breath sounds.   Abdominal:      General: Abdomen is flat.      Palpations: Abdomen is soft.   Musculoskeletal: Normal range of motion.   Lymphadenopathy:      Cervical: No cervical adenopathy.   Skin:     General: Skin is warm and dry.      Findings: No rash.   Neurological:      General: No focal deficit present.      Mental Status: He is alert and oriented to person, place, and time.   Psychiatric:         Mood and Affect: Mood normal.         Behavior: Behavior normal.       Rapid Strep test is negative; await throat culture results.    ASSESSMENT:    ICD-10-CM    1. Acute pharyngitis, unspecified etiology J02.9 BETA STREP GROUP A R/O CULTURE     RAPID STREP SCREEN     PLAN:   Patient Instructions   Rapid strep test today is negative.   Your throat culture is pending. [facility] will call if positive results to start antibiotics at that time; No call if the culture is negative.  Drink plenty of fluids and rest.  May use salt water gargles- about 8 oz warm water with about 1 teaspoon salt  Sucrets and Cepacol spray are over the counter medications that numb the throat.  Over the counter pain relievers such as tylenol or ibuprofen may be used as needed.  Honey has been shown to be helpful in cough management and is soothing to a sore throat. May add to lemon tea.  Please follow up with primary care provider if not improving, worsening or new symptoms.    Follow up with primary care provider with any problems, questions or concerns or if symptoms worsen or fail to improve. Patient agreed to plan and verbalized understanding.    LASHAY Wei  [facility] [place]

## 2019-12-20 LAB
BACTERIA SPEC CULT: NORMAL
SPECIMEN SOURCE: NORMAL

## 2019-12-30 ENCOUNTER — OFFICE VISIT (OUTPATIENT)
Dept: FAMILY MEDICINE | Facility: OTHER | Age: 34
End: 2019-12-30
Payer: COMMERCIAL

## 2019-12-30 VITALS
BODY MASS INDEX: 30.41 KG/M2 | HEART RATE: 76 BPM | HEIGHT: 75 IN | SYSTOLIC BLOOD PRESSURE: 114 MMHG | DIASTOLIC BLOOD PRESSURE: 82 MMHG | WEIGHT: 244.6 LBS | OXYGEN SATURATION: 96 % | TEMPERATURE: 98.3 F | RESPIRATION RATE: 14 BRPM

## 2019-12-30 DIAGNOSIS — J40 BRONCHITIS: Primary | ICD-10-CM

## 2019-12-30 PROCEDURE — 99213 OFFICE O/P EST LOW 20 MIN: CPT | Performed by: PHYSICIAN ASSISTANT

## 2019-12-30 RX ORDER — FAMOTIDINE 20 MG/1
1-2 TABLET, FILM COATED ORAL DAILY
COMMUNITY
Start: 2019-12-02

## 2019-12-30 RX ORDER — ALBUTEROL SULFATE 90 UG/1
2 AEROSOL, METERED RESPIRATORY (INHALATION) EVERY 4 HOURS PRN
Qty: 18 G | Refills: 0 | Status: SHIPPED | OUTPATIENT
Start: 2019-12-30

## 2019-12-30 ASSESSMENT — MIFFLIN-ST. JEOR: SCORE: 2118.87

## 2019-12-30 NOTE — PROGRESS NOTES
Subjective     Zaire Olivares is a 34 year old male who presents to clinic today for the following health issues:    HPI   Acute Illness   Acute illness concerns: cough, shortness of breath, ongoing cold  Onset: about 2-3 weeks    Fever: no    Chills/Sweats: no    Headache (location?): YES    Sinus Pressure:YES    Conjunctivitis:  no    Ear Pain: no    Rhinorrhea: YES    Congestion: YES    Sore Throat: no     Cough: YES-non-productive, with shortness of breath    Wheeze: YES    Decreased Appetite: no    Nausea: no    Vomiting: no    Diarrhea:  no    Dysuria/Freq.: no    Fatigue/Achiness: YES- fatigue. When he coughs he does feel a pain in his neck. Feels like a pinched nerve. Doesn't think its from the sickness but did strain it getting into his [place].    Sick/Strep Exposure: YES- son had been diagnosed with strep.      Therapies Tried and outcome: advil cold and sinus, mucinex     Symptoms started about 2 weeks ago.  At symptom onset he has a sore throat in which he was seen in urgent care and tested negative for strep although son tested positive. He no longer has a sore throat. Continues to report feeling wheezy, short of breath, and a non-productive cough that can be elicited by taking a deep breath. Not painful with taking deep breaths. Does not think the cough is better or worse at a certain time of day however may be slightly worse in morning. Had a similar cough like this last year and was told he had asthma as a child but never relied on an inhaler. Patients son has asthma. States his activity level is still okay with a little fatigue but activity does not seem to make his cough worse. Denies fevers, chills, and sweats. States that he has clear nasal drainage that will sometimes be more purulent later in the day. Reports that OTC medicine makes it better. Reports that he has had a headache occasionally. Reported he [place] head on his [place] the other day and when he coughs it hurts but it feels like  "pinched nerve.      Current Outpatient Medications   Medication Sig Dispense Refill     albuterol (PROAIR HFA/PROVENTIL HFA/VENTOLIN HFA) 108 (90 Base) MCG/ACT inhaler Inhale 2 puffs into the lungs every 4 hours as needed for shortness of breath / dyspnea or wheezing 18 g 0     cetirizine (ZYRTEC) 10 MG tablet Take 10 mg by mouth daily       famotidine (PEPCID) 20 MG tablet Take 1-2 tablets by mouth daily       diclofenac (VOLTAREN) 75 MG EC tablet Take 1 tablet (75 mg) by mouth 2 times daily (Patient not taking: Reported on 9/3/2019) 60 tablet 1     Allergies   Allergen Reactions     No Known Allergies      Reviewed and updated as needed this visit by Provider         Review of Systems   ROS COMP: Constitutional, HEENT, cardiovascular, pulmonary, GI, , musculoskeletal, skin, and psych systems are negative, except as otherwise noted.      Objective    /82   Pulse 76   Temp 98.3  F (36.8  C) (Temporal)   Resp 14   Ht 1.895 m (6' 2.61\")   Wt 110.9 kg (244 lb 9.6 oz)   SpO2 96%   BMI 30.90 kg/m    Body mass index is 30.9 kg/m .  Physical Exam   GENERAL: healthy, alert and no distress  EYES: Eyes grossly normal to inspection, PERRL and conjunctivae and sclerae normal  HENT: normal cephalic/atraumatic, ear canals and TM's normal, nose and mouth without ulcers or lesions, rhinorrhea purulent, oropharynx clear, oral mucous membranes moist, tonsillar erythema and sinuses: not tender  NECK: no adenopathy, no asymmetry, masses, or scars and thyroid normal to palpation  RESP: lungs clear to auscultation - no rales, rhonchi or wheezes  CV: regular rate and rhythm, normal S1 S2, no S3 or S4, no murmur, click or rub, no peripheral edema and peripheral pulses strong  MS: no gross musculoskeletal defects noted, no edema  SKIN: no suspicious lesions or rashes  PSYCH: mentation appears normal, affect normal/bright  LYMPH: no cervical, supraclavicular, axillary, or inguinal adenopathy    Diagnostic Test Results:  Labs " "reviewed in Psychiatric        Assessment & Plan       ICD-10-CM    1. Bronchitis J40 albuterol (PROAIR HFA/PROVENTIL HFA/VENTOLIN HFA) 108 (90 Base) MCG/ACT inhaler   Differential included but was not limited to: influenza, sinusitis, pneumonia, viral pharyngitis and viral upper respiratory illness or common cold. Patient's exam relatively benign with afebrile and improvement with OTC medications most suggestive of viral bronchitis. Symptomatic therapy suggested: push fluids, use OTC as described in patient instructions. Call or return to clinic prn if these symptoms worsen or fail to improve as anticipated.     MEDICATIONS:        - Start taking albuterol inhaler prn for shortness of breath or wheezing       - Continue other medications without change    Patient Instructions   Nasal Saline: At the pharmacy you can find a \"Nettipot\" or NeilMed Nasal Rinse.  This is a salt solution that you mix with warm water.  I want you to perform nasal saline washes at least twice daily while you are sick.  You can do this anytime you feel like you are developing nasal congestion.  To properly utilize be sure you are leaning forward as far as you can and either tilt or squeeze slowly.  In the beginning this can be difficult to get to work properly but as the congestion is improved it will work easier.  If you don't use these properly you can feel as if you're drowning.     Nasal Steroid: Fluticasone/Flonase or Nasacort, These are OTC medications for allergies.  They are steroid sprays that are localized to the nose so no systemic effects. Two sprays each nostril once daily - allergist noted it is most effective if used before bed.  Ok to continue to use nasal saline as well.  When you spray it in the nose it should be up and out towards the eye on the side you are spraying the medication.  You should not taste the medication and it should not drip out the nose. This will decrease inflammation and also nasal mucous production.  You can " also use this anytime you are developing nasal congestion.  Ok in the future to start these as soon as you feel you are developing nasal congestion, sinus pressure, increased nasal drainage.    If no blood pressure issues recommend Mucinex DM Max, if issues with your blood pressure ok to just use plain mucinex product.    Follow-up if symptoms worsen or do not start to resolve in the next week.  Feel free to call with any questions or concerns.        Return in about 1 week (around 1/6/2020) for If not improving, sooner if worse or new concerns.    The patient indicates understanding of these issues and agrees with the plan.    Abeba YOUNG    I, David Dinero PA-C, was present with the Physician Assistant student who participated in the service and in the documentation of the note.  I have verified the history and personally performed the physical exam and medical decision making.  I agree with the assessment and plan of care as documented in the note.     Options for treatment and follow-up care were reviewed with the patient and/or guardian. Patient and/or guardian engaged in the decision making process and verbalized understanding of the options discussed and agreed with the final plan.    David Dinero PA-C  [facility]

## 2019-12-30 NOTE — PATIENT INSTRUCTIONS
"Nasal Saline: At the pharmacy you can find a \"Nettipot\" or NeilMed Nasal Rinse.  This is a salt solution that you mix with warm water.  I want you to perform nasal saline washes at least twice daily while you are sick.  You can do this anytime you feel like you are developing nasal congestion.  To properly utilize be sure you are leaning forward as far as you can and either tilt or squeeze slowly.  In the beginning this can be difficult to get to work properly but as the congestion is improved it will work easier.  If you don't use these properly you can feel as if you're drowning.     Nasal Steroid: Fluticasone/Flonase or Nasacort, These are OTC medications for allergies.  They are steroid sprays that are localized to the nose so no systemic effects. Two sprays each nostril once daily - allergist noted it is most effective if used before bed.  Ok to continue to use nasal saline as well.  When you spray it in the nose it should be up and out towards the eye on the side you are spraying the medication.  You should not taste the medication and it should not drip out the nose. This will decrease inflammation and also nasal mucous production.  You can also use this anytime you are developing nasal congestion.  Ok in the future to start these as soon as you feel you are developing nasal congestion, sinus pressure, increased nasal drainage.    If no blood pressure issues recommend Mucinex DM Max, if issues with your blood pressure ok to just use plain mucinex product.    Follow-up if symptoms worsen or do not start to resolve in the next week.  Feel free to call with any questions or concerns.      "

## 2020-06-09 ENCOUNTER — E-VISIT (OUTPATIENT)
Dept: FAMILY MEDICINE | Facility: CLINIC | Age: 35
End: 2020-06-09
Payer: COMMERCIAL

## 2020-06-09 DIAGNOSIS — Z53.9 ERRONEOUS ENCOUNTER--DISREGARD: Primary | ICD-10-CM

## 2020-07-08 ENCOUNTER — MYC MEDICAL ADVICE (OUTPATIENT)
Dept: FAMILY MEDICINE | Facility: CLINIC | Age: 35
End: 2020-07-08

## 2020-07-08 DIAGNOSIS — M25.522 LEFT ELBOW PAIN: ICD-10-CM

## 2020-07-08 DIAGNOSIS — M25.521 RIGHT ELBOW PAIN: Primary | ICD-10-CM

## 2020-07-08 NOTE — TELEPHONE ENCOUNTER
Apparently it is not his left elbow not the right like last time.  Can you sign the order again and close the encounter.  They scheduled his already.

## 2020-07-08 NOTE — TELEPHONE ENCOUNTER
I put a new order in for sport med. If you agree sign order and route back to me and I will let him know.

## 2020-07-10 ENCOUNTER — OFFICE VISIT (OUTPATIENT)
Dept: ORTHOPEDICS | Facility: CLINIC | Age: 35
End: 2020-07-10
Payer: COMMERCIAL

## 2020-07-10 ENCOUNTER — ANCILLARY PROCEDURE (OUTPATIENT)
Dept: GENERAL RADIOLOGY | Facility: CLINIC | Age: 35
End: 2020-07-10
Attending: PHYSICIAN ASSISTANT
Payer: COMMERCIAL

## 2020-07-10 VITALS
WEIGHT: 245 LBS | TEMPERATURE: 98 F | DIASTOLIC BLOOD PRESSURE: 74 MMHG | BODY MASS INDEX: 31.44 KG/M2 | HEIGHT: 74 IN | SYSTOLIC BLOOD PRESSURE: 118 MMHG

## 2020-07-10 DIAGNOSIS — M77.12 LEFT LATERAL EPICONDYLITIS: Primary | ICD-10-CM

## 2020-07-10 DIAGNOSIS — M25.529 ELBOW PAIN: ICD-10-CM

## 2020-07-10 PROCEDURE — 99213 OFFICE O/P EST LOW 20 MIN: CPT | Mod: 25 | Performed by: PHYSICIAN ASSISTANT

## 2020-07-10 PROCEDURE — 20551 NJX 1 TENDON ORIGIN/INSJ: CPT | Mod: LT | Performed by: PHYSICIAN ASSISTANT

## 2020-07-10 PROCEDURE — 73080 X-RAY EXAM OF ELBOW: CPT | Mod: TC

## 2020-07-10 RX ORDER — TRIAMCINOLONE ACETONIDE 40 MG/ML
40 INJECTION, SUSPENSION INTRA-ARTICULAR; INTRAMUSCULAR ONCE
Status: COMPLETED | OUTPATIENT
Start: 2020-07-10 | End: 2020-07-10

## 2020-07-10 RX ADMIN — TRIAMCINOLONE ACETONIDE 40 MG: 40 INJECTION, SUSPENSION INTRA-ARTICULAR; INTRAMUSCULAR at 09:54

## 2020-07-10 ASSESSMENT — PAIN SCALES - GENERAL: PAINLEVEL: MODERATE PAIN (4)

## 2020-07-10 ASSESSMENT — MIFFLIN-ST. JEOR: SCORE: 2106.06

## 2020-07-10 NOTE — PROGRESS NOTES
"Office Visit-Follow up    Chief Complaint: Zaire Olivares is a 35 year old male who is being seen for   Chief Complaint   Patient presents with     Consult     left elbow pain per Dr. Eddy       History of Present Illness:   Mechanism of Injury: No injury fall or trauma.  Patient noted this after he had done a lot of chopping wood approximately 3 to 4 months ago  Location: Left lateral epicondyle  Duration of Pain: 3 to 4 months  Rating of Pain: Minimal  Pain Quality: Achy  Pain is better with: Rest and forearm band  Pain is worse with: Wrist extension  Treatment so far consists of: Rest, forearm band, ice, home exercise program.  Patient has not taken any medication such as Tylenol or ibuprofen.   Associated Features: Patient denies any numbness or tingling.  Patient states this seems to feel exactly like his right elbow when he had lateral epicondylitis in 2018.  Pain is Limiting: Wrist extension and throwing wood that he chopped  Here to: Orthopedic consultation  Additional History: None    REVIEW OF SYSTEMS  General: negative for, night sweats, dizziness, fatigue  Resp: No shortness of breath and no cough  CV: negative for chest pain, syncope or near-syncope  GI: negative for nausea, vomiting and diarrhea  : negative for dysuria and hematuria  Musculoskeletal: as above  Neurologic: negative for syncope   Hematologic: negative for bleeding disorder    Physical Exam:  Vitals: /74   Temp 98  F (36.7  C)   Ht 1.88 m (6' 2\")   Wt 111.1 kg (245 lb)   BMI 31.46 kg/m    BMI= Body mass index is 31.46 kg/m .  Constitutional: healthy, alert and no acute distress   Psychiatric: mentation appears normal and affect normal/bright  NEURO: no focal deficits, CMS intact left upper extremity   RESP: Normal with easy respirations and no use of accessory muscles to breathe, no audible wheezing or retractions  CV: +2 radial pulse and his hand is warm to palpation.   SKIN: No erythema, rashes, excoriation, or " breakdown. No evidence of infection.   MUSCULOSKELETAL:    INSPECTION of left elbow: No gross deformities, erythema, edema, ecchymosis, atrophy or fasciculations.     PALPATION: Lateral epicondyl tenderness.  No tenderness medial or the olecranon or in the cubital fossa.  No tenderness forearm, wrist or upper arm.  No increased warmth.     ROM: Elbow flexion 155 degrees, elbow extension full, supination 90, pronation 90. The range of motion is without catching locking or pain.        STRENGTH: 5 out of 5 biceps and triceps as well as pronation and supination and .  5 out of 5 wrist extension and wrist flexion and slight pain at lateral epicondyle with wrist extension testing.    SPECIAL TEST: none  GAIT: non-antalgic  Lymph: no palpable lymph nodes      Diagnostic Modalities:  Recent Results (from the past 744 hour(s))   XR Elbow Left G/E 3 Views    Narrative    LEFT ELBOW THREE OR MORE VIEWS   7/10/2020 8:49 AM     HISTORY: Elbow pain.    COMPARISON: None.    FINDINGS: No acute fractures or dislocations. Alignment is anatomic.  Mildly prominent soft tissues around the olecranon process could  represent mild bursitis or edema. No anterior or posterior fat pad  elevation to suggest occult fracture.      Impression    IMPRESSION:   1. No acute fracture or malalignment.  2. Mild soft tissue prominence around the olecranon process could  represent olecranon bursitis versus soft tissue contusion or edema of  other etiology.       I agree with the above reading.    Independent visualization of the images was performed.      Impression: 1.  Left elbow lateral epicondylitis    Plan:  All of the above pertinent physical exam and imaging modalities findings was reviewed with Zaire.                                          INJECTION PROCEDURE:  The patient was counseled about an  injection, including discussion of risks (including infection), contents of the injection, rationale for performing the injection, and expected  benefits of the injection. The skin was prepped with alcohol and betadine and then utilizing sterile technique an injection of the left elbow lateral epicondyle injection was performed. I palpated to find the area of most tenderness, then I used malina chloride spray prior to doing the injection. I made sure he did not have any shooting, burning or electric sensation prior to injecting. The injection consisted 1ml of Kenalog (40mg per 1ml) with 3ml 1% lidocaine plain. The patient tolerated the injection well, and there were no complications. The injection site was covered with a Band-Aid. The injection was performed by BRANDEN Kaur    FOCUSED PLAN:   Patient with history of right lateral epicondylitis and states this left elbow feels the same.  His pain is a little better today, However he has a [place] where he is going to have to do a lot of paddling coming up.  I am okay doing a cortisone injection today and have shown him proper stretches.  He will continue his home exercise program, continue ice and rest, continue band and he will  a left elbow brace to keep him from doing elbow extension.  Patient would like to avoid medications.  Follow-up on an as-needed basis.    Re-x-ray on return: No    BP Readings from Last 1 Encounters:   07/10/20 118/74       BP noted to be well controlled today in office.      Patient does not use Tobacco products.    This note was dictated with [employer].    Chai Vallejo PA-C

## 2020-07-10 NOTE — LETTER
"    7/10/2020         RE: Zaire Olivares  [address] [facility] [place] [address]        Dear Colleague,    Thank you for referring your patient, Zaire Olivares, to the [facility]. Please see a copy of my visit note below.    Office Visit-Follow up    Chief Complaint: Zaire Olivares is a 35 year old male who is being seen for   Chief Complaint   Patient presents with     Consult     left elbow pain per Dr. Eddy       History of Present Illness:   Mechanism of Injury: No injury fall or trauma.  Patient noted this after he had done a lot of chopping wood approximately 3 to 4 months ago  Location: Left lateral epicondyle  Duration of Pain: 3 to 4 months  Rating of Pain: Minimal  Pain Quality: Achy  Pain is better with: Rest and forearm band  Pain is worse with: Wrist extension  Treatment so far consists of: Rest, forearm band, ice, home exercise program.  Patient has not taken any medication such as Tylenol or ibuprofen.   Associated Features: Patient denies any numbness or tingling.  Patient states this seems to feel exactly like his right elbow when he had lateral epicondylitis in 2018.  Pain is Limiting: Wrist extension and throwing wood that he chopped  Here to: Orthopedic consultation  Additional History: None    REVIEW OF SYSTEMS  General: negative for, night sweats, dizziness, fatigue  Resp: No shortness of breath and no cough  CV: negative for chest pain, syncope or near-syncope  GI: negative for nausea, vomiting and diarrhea  : negative for dysuria and hematuria  Musculoskeletal: as above  Neurologic: negative for syncope   Hematologic: negative for bleeding disorder    Physical Exam:  Vitals: /74   Temp 98  F (36.7  C)   Ht 1.88 m (6' 2\")   Wt 111.1 kg (245 lb)   BMI 31.46 kg/m    BMI= Body mass index is 31.46 kg/m .  Constitutional: healthy, alert and no acute distress   Psychiatric: mentation appears normal and affect normal/bright  NEURO: no focal deficits, CMS intact left " upper extremity   RESP: Normal with easy respirations and no use of accessory muscles to breathe, no audible wheezing or retractions  CV: +2 radial pulse and his hand is warm to palpation.   SKIN: No erythema, rashes, excoriation, or breakdown. No evidence of infection.   MUSCULOSKELETAL:    INSPECTION of left elbow: No gross deformities, erythema, edema, ecchymosis, atrophy or fasciculations.     PALPATION: Lateral epicondyl tenderness.  No tenderness medial or the olecranon or in the cubital fossa.  No tenderness forearm, wrist or upper arm.  No increased warmth.     ROM: Elbow flexion 155 degrees, elbow extension full, supination 90, pronation 90. The range of motion is without catching locking or pain.        STRENGTH: 5 out of 5 biceps and triceps as well as pronation and supination and .  5 out of 5 wrist extension and wrist flexion and slight pain at lateral epicondyle with wrist extension testing.    SPECIAL TEST: none  GAIT: non-antalgic  Lymph: no palpable lymph nodes      Diagnostic Modalities:  Recent Results (from the past 744 hour(s))   XR Elbow Left G/E 3 Views    Narrative    LEFT ELBOW THREE OR MORE VIEWS   7/10/2020 8:49 AM     HISTORY: Elbow pain.    COMPARISON: None.    FINDINGS: No acute fractures or dislocations. Alignment is anatomic.  Mildly prominent soft tissues around the olecranon process could  represent mild bursitis or edema. No anterior or posterior fat pad  elevation to suggest occult fracture.      Impression    IMPRESSION:   1. No acute fracture or malalignment.  2. Mild soft tissue prominence around the olecranon process could  represent olecranon bursitis versus soft tissue contusion or edema of  other etiology.       I agree with the above reading.    Independent visualization of the images was performed.      Impression: 1.  Left elbow lateral epicondylitis    Plan:  All of the above pertinent physical exam and imaging modalities findings was reviewed with Riley                                           INJECTION PROCEDURE:  The patient was counseled about an  injection, including discussion of risks (including infection), contents of the injection, rationale for performing the injection, and expected benefits of the injection. The skin was prepped with alcohol and betadine and then utilizing sterile technique an injection of the left elbow lateral epicondyle injection was performed. I palpated to find the area of most tenderness, then I used malina chloride spray prior to doing the injection. I made sure he did not have any shooting, burning or electric sensation prior to injecting. The injection consisted 1ml of Kenalog (40mg per 1ml) with 3ml 1% lidocaine plain. The patient tolerated the injection well, and there were no complications. The injection site was covered with a Band-Aid. The injection was performed by BRANDEN Kaur    FOCUSED PLAN:   Patient with history of right lateral epicondylitis and states this left elbow feels the same.  His pain is a little better today, However he has a [place] where he is going to have to do a lot of paddling coming up.  I am okay doing a cortisone injection today and have shown him proper stretches.  He will continue his home exercise program, continue ice and rest, continue band and he will  a left elbow brace to keep him from doing elbow extension.  Patient would like to avoid medications.  Follow-up on an as-needed basis.    Re-x-ray on return: No    BP Readings from Last 1 Encounters:   07/10/20 118/74       BP noted to be well controlled today in office.      Patient does not use Tobacco products.    This note was dictated with [employer]oz[employer].    Chai Vallejo PA-C                Prior to injection, verified patient identity using patient's name and date of birth.  Due to injection administration, patient instructed to remain in clinic for 15 minutes  afterwards, and to report any adverse reaction to me  immediately.    Joint injection was performed.      Drug Amount Wasted:  None.  Vial/Syringe: Single dose vial  : [employer]  EXPIRATION DATE:  11/2021  NDC: [address]-8    Lot   [id number]  Left elbow  Chai Vallejo PA-C       Again, thank you for allowing me to participate in the care of your patient.        Sincerely,        Chai Vallejo PA-C

## 2020-07-10 NOTE — PROGRESS NOTES
Prior to injection, verified patient identity using patient's name and date of birth.  Due to injection administration, patient instructed to remain in clinic for 15 minutes  afterwards, and to report any adverse reaction to me immediately.    Joint injection was performed.      Drug Amount Wasted:  None.  Vial/Syringe: Single dose vial  : [employer]  EXPIRATION DATE:  11/2021  NDC: [address]-9    Lot   [id number]  Left elbow  Chai Vallejo PA-C

## 2021-01-09 ENCOUNTER — HEALTH MAINTENANCE LETTER (OUTPATIENT)
Age: 36
End: 2021-01-09

## 2021-04-30 ENCOUNTER — MYC MEDICAL ADVICE (OUTPATIENT)
Dept: FAMILY MEDICINE | Facility: CLINIC | Age: 36
End: 2021-04-30

## 2021-05-03 ENCOUNTER — OFFICE VISIT (OUTPATIENT)
Dept: PODIATRY | Facility: CLINIC | Age: 36
End: 2021-05-03
Payer: COMMERCIAL

## 2021-05-03 VITALS
BODY MASS INDEX: 30.93 KG/M2 | HEIGHT: 74 IN | DIASTOLIC BLOOD PRESSURE: 78 MMHG | WEIGHT: 241 LBS | SYSTOLIC BLOOD PRESSURE: 120 MMHG

## 2021-05-03 DIAGNOSIS — M76.821 POSTERIOR TIBIAL TENDONITIS OF RIGHT LEG: ICD-10-CM

## 2021-05-03 DIAGNOSIS — Q66.70 PES CAVUS, CONGENITAL: Primary | ICD-10-CM

## 2021-05-03 PROCEDURE — 99213 OFFICE O/P EST LOW 20 MIN: CPT | Performed by: PODIATRIST

## 2021-05-03 ASSESSMENT — PAIN SCALES - GENERAL: PAINLEVEL: NO PAIN (0)

## 2021-05-03 ASSESSMENT — MIFFLIN-ST. JEOR: SCORE: 2089.41

## 2021-05-03 NOTE — PATIENT INSTRUCTIONS
Reliable shoe stores: To maximize your experience and provide the best possible fit.  Be sure to show them your foot concerns and tell them Dr. Ferrell sent you.      Stores listed in bold have only athletic shoes, and stores that are not bold are mostly casual or variety of shoes    [employer]  [address] [place] [address]  [phone]    [employer] [place]  [address] [place] [address]  [phone]     [employer] [place]  [address] [place] [address]  [phone]    [employer]  [address] [facility]  [facility] [address]  [phone]    [employer]  [address] [place], [place] [address] [phone]    [employer]  [address] [place] [address]  [phone]                         [employer] Locations:     [address] [place] [address]   [phone]     [address] [place] [address] [address] [place] [address]   [phone]     [address] [place] [address]   [phone]     [address] [facility] [address]   [phone]     [address] [place] [address]   [phone]     [address] [place] [address]   [phone]     [address]   [place] [address]   [phone]     [address] [facility]   [place] [address]   [phone]             How to find reasonable shoes          The correct width    Correct Fitting    Correct Length      Foot Distortion    Posture Distortion                          Torsional Rigidity      Grasp behind the heel and underneath the foot and twist      Bad    Excessive torsion/twist in midfoot     Less torsion/twist in midfoot is better                   Heel Counter Rigidity      Grasp just above   midsole and squeeze      Bad    Soft heel counter      Good    Rigid Heel Counter      Flexion Rigidity      Grasp shoe and bend from forefoot to rearfoot

## 2021-05-03 NOTE — PROGRESS NOTES
Chief Complaint   Patient presents with     RECHECK     Right posterior tibial tendinitis, Right ankle bursitis, pes cavus; LOV 7/24/2019     Consult     requesting new orthotics       Weight management plan: Patient was referred to their PCP to discuss a diet and exercise plan.     HPI:  Zaire Olivares is a 34 year old male who is seen in consultation at the request of self.    Pt presents for eval of:   (Onset, Location, L/R, Character, Treatments, Injury if yes)    XR Right foot today 7/24/2019     Onset 7/23/2019, medial Right foot pain, middle of night right foot started to throb, no injury noted. Presents today with slip on casual shoes.  Patient notes that over the summer months he has been wearing more flip-flops and crocs and when he started carrying canoes in preparation [place] this pain started.  He has been carrying the canoes in flip-flops and crocs for 1/2 miles.  Constant swelling, dull ache, Intermittent, sharp, throbbing pain 6  Ice, wears orthotics only while working    Since last visit he states that overall his feet are quite satisfied as long as he wears his orthotics but they are starting to fall apart and he would like to obtain new orthotics.  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    Works as a teacher at [employer].    ROS:  10 point ROS neg other than the symptoms noted above in the HPI.    Patient Active Problem List   Diagnosis     CARDIOVASCULAR SCREENING; LDL GOAL LESS THAN 160     Plantar fasciitis     Pes cavus, congenital     Symptomatic dermographism       PAST MEDICAL HISTORY: History reviewed. No pertinent past medical history.     PAST SURGICAL HISTORY:   Past Surgical History:   Procedure Laterality Date     VASECTOMY  2/5/2016        MEDICATIONS:   Current Outpatient Medications:      cetirizine (ZYRTEC) 10 MG tablet, Take 10 mg by mouth daily, Disp: , Rfl:      famotidine (PEPCID) 20 MG tablet, Take 1-2  tablets by mouth daily, Disp: , Rfl:      albuterol (PROAIR HFA/PROVENTIL HFA/VENTOLIN HFA) 108 (90 Base) MCG/ACT inhaler, Inhale 2 puffs into the lungs every 4 hours as needed for shortness of breath / dyspnea or wheezing, Disp: 18 g, Rfl: 0     diclofenac (VOLTAREN) 75 MG EC tablet, Take 1 tablet (75 mg) by mouth 2 times daily (Patient not taking: Reported on 9/3/2019), Disp: 60 tablet, Rfl: 1     ALLERGIES:    Allergies   Allergen Reactions     No Known Allergies         SOCIAL HISTORY:   Social History     Socioeconomic History     Marital status:      Spouse name: Not on file     Number of children: Not on file     Years of education: Not on file     Highest education level: Not on file   Occupational History     Not on file   Social Needs     Financial resource strain: Not on file     Food insecurity     Worry: Not on file     Inability: Not on file     Transportation needs     Medical: Not on file     Non-medical: Not on file   Tobacco Use     Smoking status: Never Smoker     Smokeless tobacco: Never Used   Substance and Sexual Activity     Alcohol use: Yes     Alcohol/week: 0.0 standard drinks     Comment: weekend     Drug use: No     Sexual activity: Yes     Partners: Female     Birth control/protection: Surgical, Male Surgical     Comment: vasectomy   Lifestyle     Physical activity     Days per week: Not on file     Minutes per session: Not on file     Stress: Not on file   Relationships     Social connections     Talks on phone: Not on file     Gets together: Not on file     Attends Christian service: Not on file     Active member of club or organization: Not on file     Attends meetings of clubs or organizations: Not on file     Relationship status: Not on file     Intimate partner violence     Fear of current or ex partner: Not on file     Emotionally abused: Not on file     Physically abused: Not on file     Forced sexual activity: Not on file   Other Topics Concern     Parent/sibling w/ CABG,  "MI or angioplasty before 65F 55M? Not Asked   Social History Narrative     Not on file        FAMILY HISTORY:   Family History   Problem Relation Age of Onset     Heart Disease Father      Heart Disease Paternal Grandfather         EXAM:Vitals: /78 (BP Location: Left arm, Patient Position: Sitting, Cuff Size: Adult Regular)   Ht 1.89 m (6' 2.41\")   Wt 109.3 kg (241 lb)   BMI 30.60 kg/m    BMI= Body mass index is 30.6 kg/m .    General appearance: Patient is alert and fully cooperative with history & exam.  No sign of distress is noted during the visit.     Psychiatric: Affect is pleasant & appropriate.  Patient appears motivated to improve health.     Respiratory: Breathing is regular & unlabored while sitting.     HEENT: Hearing is intact to spoken word.  Speech is clear.  No gross evidence of visual impairment that would impact ambulation.     Vascular: DP & PT pulses are intact & regular bilaterally.  No significant edema or varicosities noted.  CFT and skin temperature is normal to both lower extremities.     Neurologic: Lower extremity sensation is intact to light touch.  No evidence of weakness or contracture in the lower extremities.  No evidence of neuropathy.    Dermatologic: Skin is intact to both lower extremities with adequate texture, turgor and tone about the integument.  No paronychia or evidence of soft tissue infection is noted.     Musculoskeletal: Patient is ambulatory without assistive device or brace.  High arch cavus foot type noted.  History of discomfort is noted with firm palpation at the insertion of the posterior tibial tendon and mostly just anterior to the navicular prominence right greater than left.  The navicular is quite prominent bilateral.     ASSESSMENT:       ICD-10-CM    1. Pes cavus, congenital  Q66.70 Orthotics, Prosthetics and Custom Compression Order for DME - ONLY FOR DME   2. Posterior tibial tendonitis of right leg  M76.821 Orthotics, Prosthetics and Custom " Compression Order for DME - ONLY FOR DME        PLAN:  Reviewed patient's chart in epic.      7/24/2019   Recommended discontinuing the crocs in flip-flops and no caring canoes or anything more than 30 pounds over the next couple of weeks.  He has custom molded orthotics, 3 pairs in fact, he was instructed to use them.  Begin oral anti-inflammatory over the next couple of weeks.  Letter was dispensed for not to lift more than 30 pounds and no canoes over the next 2 weeks.  Follow-up in the next 2 to 3 weeks if this remains symptomatic otherwise as needed.  All questions were answered.    5/3/2021  Current orthotics are broken down and the top-cover is crushed.  Recommended new orthotics.  We may consider different materials to be more durable however this would provide less cushion for him.  Order was placed for multiple pairs of orthotics.  Follow-up as needed.      Bar Ferrell DPM

## 2021-05-03 NOTE — LETTER
5/3/2021         RE: Zaire Olivares  [id number] [facility] [id number]        Dear Colleague,    Thank you for referring your patient, Zaire Olivares, to the [facility]. Please see a copy of my visit note below.    Chief Complaint   Patient presents with     RECHECK     Right posterior tibial tendinitis, Right ankle bursitis, pes cavus; LOV 7/24/2019     Consult     requesting new orthotics       Weight management plan: Patient was referred to their PCP to discuss a diet and exercise plan.     HPI:  Zaire Olivares is a 34 year old male who is seen in consultation at the request of self.    Pt presents for eval of:   (Onset, Location, L/R, Character, Treatments, Injury if yes)    XR Right foot today 7/24/2019     Onset 7/23/2019, medial Right foot pain, middle of night right foot started to throb, no injury noted. Presents today with slip on casual shoes.  Patient notes that over the summer months he has been wearing more flip-flops and crocs and when he started carrying canoes in preparation [place] this pain started.  He has been carrying the canoes in flip-flops and crocs for 1/2 miles.  Constant swelling, dull ache, Intermittent, sharp, throbbing pain 6  Ice, wears orthotics only while working    Since last visit he states that overall his feet are quite satisfied as long as he wears his orthotics but they are starting to fall apart and he would like to obtain new orthotics.  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    Works as a teacher at [employer].    ROS:  10 point ROS neg other than the symptoms noted above in the HPI.    Patient Active Problem List   Diagnosis     CARDIOVASCULAR SCREENING; LDL GOAL LESS THAN 160     Plantar fasciitis     Pes cavus, congenital     Symptomatic dermographism       PAST MEDICAL HISTORY: History reviewed. No pertinent past medical history.     PAST SURGICAL HISTORY:   Past  Surgical History:   Procedure Laterality Date     VASECTOMY  2/5/2016        MEDICATIONS:   Current Outpatient Medications:      cetirizine (ZYRTEC) 10 MG tablet, Take 10 mg by mouth daily, Disp: , Rfl:      famotidine (PEPCID) 20 MG tablet, Take 1-2 tablets by mouth daily, Disp: , Rfl:      albuterol (PROAIR HFA/PROVENTIL HFA/VENTOLIN HFA) 108 (90 Base) MCG/ACT inhaler, Inhale 2 puffs into the lungs every 4 hours as needed for shortness of breath / dyspnea or wheezing, Disp: 18 g, Rfl: 0     diclofenac (VOLTAREN) 75 MG EC tablet, Take 1 tablet (75 mg) by mouth 2 times daily (Patient not taking: Reported on 9/3/2019), Disp: 60 tablet, Rfl: 1     ALLERGIES:    Allergies   Allergen Reactions     No Known Allergies         SOCIAL HISTORY:   Social History     Socioeconomic History     Marital status:      Spouse name: Not on file     Number of children: Not on file     Years of education: Not on file     Highest education level: Not on file   Occupational History     Not on file   Social Needs     Financial resource strain: Not on file     Food insecurity     Worry: Not on file     Inability: Not on file     Transportation needs     Medical: Not on file     Non-medical: Not on file   Tobacco Use     Smoking status: Never Smoker     Smokeless tobacco: Never Used   Substance and Sexual Activity     Alcohol use: Yes     Alcohol/week: 0.0 standard drinks     Comment: weekend     Drug use: No     Sexual activity: Yes     Partners: Female     Birth control/protection: Surgical, Male Surgical     Comment: vasectomy   Lifestyle     Physical activity     Days per week: Not on file     Minutes per session: Not on file     Stress: Not on file   Relationships     Social connections     Talks on phone: Not on file     Gets together: Not on file     Attends [employer] service: Not on file     Active member of club or organization: Not on file     Attends meetings of clubs or organizations: Not on file     Relationship status:  "Not on file     Intimate partner violence     Fear of current or ex partner: Not on file     Emotionally abused: Not on file     Physically abused: Not on file     Forced sexual activity: Not on file   Other Topics Concern     Parent/sibling w/ CABG, MI or angioplasty before 65F 55M? Not Asked   Social History Narrative     Not on file        FAMILY HISTORY:   Family History   Problem Relation Age of Onset     Heart Disease Father      Heart Disease Paternal Grandfather         EXAM:Vitals: /78 (BP Location: Left arm, Patient Position: Sitting, Cuff Size: Adult Regular)   Ht 1.89 m (6' 2.41\")   Wt 109.3 kg (241 lb)   BMI 30.60 kg/m    BMI= Body mass index is 30.6 kg/m .    General appearance: Patient is alert and fully cooperative with history & exam.  No sign of distress is noted during the visit.     Psychiatric: Affect is pleasant & appropriate.  Patient appears motivated to improve health.     Respiratory: Breathing is regular & unlabored while sitting.     HEENT: Hearing is intact to spoken word.  Speech is clear.  No gross evidence of visual impairment that would impact ambulation.     Vascular: DP & PT pulses are intact & regular bilaterally.  No significant edema or varicosities noted.  CFT and skin temperature is normal to both lower extremities.     Neurologic: Lower extremity sensation is intact to light touch.  No evidence of weakness or contracture in the lower extremities.  No evidence of neuropathy.    Dermatologic: Skin is intact to both lower extremities with adequate texture, turgor and tone about the integument.  No paronychia or evidence of soft tissue infection is noted.     Musculoskeletal: Patient is ambulatory without assistive device or brace.  High arch cavus foot type noted.  History of discomfort is noted with firm palpation at the insertion of the posterior tibial tendon and mostly just anterior to the navicular prominence right greater than left.  The navicular is quite " prominent bilateral.     ASSESSMENT:       ICD-10-CM    1. Pes cavus, congenital  Q66.70 Orthotics, Prosthetics and Custom Compression Order for DME - ONLY FOR DME   2. Posterior tibial tendonitis of right leg  M76.821 Orthotics, Prosthetics and Custom Compression Order for DME - ONLY FOR DME        PLAN:  Reviewed patient's chart in [facility].      7/24/2019   Recommended discontinuing the crocs in flip-flops and no caring canoes or anything more than 30 pounds over the next couple of weeks.  He has custom molded orthotics, 3 pairs in fact, he was instructed to use them.  Begin oral anti-inflammatory over the next couple of weeks.  Letter was dispensed for not to lift more than 30 pounds and no canoes over the next 2 weeks.  Follow-up in the next 2 to 3 weeks if this remains symptomatic otherwise as needed.  All questions were answered.    5/3/2021  Current orthotics are broken down and the top-cover is crushed.  Recommended new orthotics.  We may consider different materials to be more durable however this would provide less cushion for him.  Order was placed for multiple pairs of orthotics.  Follow-up as needed.      Bar Ferrell DPM      Again, thank you for allowing me to participate in the care of your patient.        Sincerely,        Bar Ferrell DPM

## 2021-10-23 ENCOUNTER — HEALTH MAINTENANCE LETTER (OUTPATIENT)
Age: 36
End: 2021-10-23

## 2022-02-12 ENCOUNTER — HEALTH MAINTENANCE LETTER (OUTPATIENT)
Age: 37
End: 2022-02-12

## 2022-10-09 ENCOUNTER — HEALTH MAINTENANCE LETTER (OUTPATIENT)
Age: 37
End: 2022-10-09

## 2022-10-24 ENCOUNTER — IMMUNIZATION (OUTPATIENT)
Dept: FAMILY MEDICINE | Facility: CLINIC | Age: 37
End: 2022-10-24
Payer: COMMERCIAL

## 2022-10-24 PROCEDURE — 90471 IMMUNIZATION ADMIN: CPT

## 2022-10-24 PROCEDURE — 90686 IIV4 VACC NO PRSV 0.5 ML IM: CPT

## 2023-01-01 NOTE — PATIENT INSTRUCTIONS
Encounter Diagnosis   Name Primary?     Lateral epicondylitis of right elbow Yes     Rest, ice and elevate above heart level as needed for pain control  1. On exam this looks like lateral epicondylitis.  That is an irritation of the tendons that go across the outside of your elbow.   2. A forearm band can help take some of the pressure off the area.  Even a wrist brace can help.  We decided to hold on that for today.  3. Stretches such as wrist flexion stretches can help relax those tendons.  Those exercises are below.    4. Formal therapy can always help.  We talked about formal occupational therapy, but decided to hold for now.    5. Mobic is a medication that could help you. We discussed the Mobic as a prescription anti-inflammatory medication you could also try but we decided to hold off on that today.    6. A cortisone injection can benefit you.  It is like taking very strong ibuprofen and putting it right were the pain and inflammation is.  7. You can followup with Donya Bey MD in 6 weeks, if you are having pain at that time we can do a cortisone injection.  You can always cancel the appointment if you are doing really well and follow-up as needed.    WebMD and [email or web address] may offer reliable information regarding your diagnosis and treatment plan.  Cortisone Instructions:     1. You received an injection of cortisone into your right elbow today.  2. The joint(s) may be more painful for the first 1-2 days.  3. We ask you to continue to rest the joint(s) for a few more days before resuming regular activities.  4. Pain Medications you can take (as long as your primary care provider allows these meds and you do not have kidney or liver conditions):  Tylenol  Take 1000 mg by mouth every 6 hours as needed; maximum dose 4000 mg a day  Ibuprofen  600 mg every 6 hours as needed; maximum 2400 mg a day  (OK to take tylenol and ibuprofen at the same time)  5. Rest, ice and elevate as needed for pain  control  6. Watch for these signs of infection: redness, swelling, drainage, warmth to touch, increased pain, or fever. Call the clinic or make an appointment to be seen if you think you have an infection.  7. If you are diabetic, make sure you keep a close eye on your blood sugars, they can get elevated with cortisone injections.   8. Sometimes it can take 1-2 weeks for it to reach its full effect.    Cortisone Injections  Cortisone is a type of steroid. It can greatly reduce swelling, redness, and irritation (inflammation) and pain. Being injected with cortisone is simple and doesn t take long. Your doctor may ask you questions about your health. Certain health conditions, such as diabetes, can be affected by cortisone.     Your pain may be relieved by a cortisone injection.   Why have a cortisone injection?  Injecting cortisone can relieve pain for anything from a sports injury to arthritis. Your doctor may suggest an injection if rest, splints, or oral medicine doesn t relieve your pain. Injecting cortisone is simpler than having surgery. And cortisone may provide the lasting pain relief that can help you get out and enjoy life again.  Getting the injection  Your doctor will start by cleaning and occasionally numbing your skin at the injection site. Next you ll be injected with local anesthetics (for short-term pain relief) and cortisone. The injection may last a few moments. A small bandage will be put over the injection site. You ll then be ready to go home.  After your injection  After being injected, make sure you don t injure the treated region. But stay active. Enjoy a walk or some other mild activity. Just be careful not to strain the region that gave you trouble.  The next day  Some people feel more pain after being injected. This is normal, and it will go away soon. Applying ice for 20 minutes at a time to your injury may reduce the increased pain. Rest for the first day or two. You don t need to stay  in bed. But avoid tasks that may strain the injured region.  If you have diabetes  Cortisone injections can cause blood sugar to be increased for several days after the injection. If you have diabetes, you should follow your blood  sugar closely during this time. Follow your regular plan for what to do when your blood sugar is elevated.     0813-3546 [employer]. [address] [place] [address]. All rights reserved. This information is not intended as a substitute for professional medical care. Always follow your healthcare professional's instructions.     THANK YOU for coming in today. If you receive a survey via [email or web address] or mail please let us know if there was anything you especially appreciated today or if there is any way we can improve our clinic. We appreciate your input.    GENERAL INFORMATION:  Our hours are:  Monday :     Clinic 7:30 AM-430 PM ([facility])  Tuesday:      Operating Room All Day ([facility])  Wednesday: Clinic 7:30 AM - 11:15 AM ([facility])             Clinic 1:00 PM - 4:00PM ([facility])  Thursday:     Administrative Day  Friday:          Clinic 7:30 AM - 11:15 AM ([facility])            Clinic 1:00 PM - 4:00 PM ([facility])    [facility] for any issues or concerns: [phone]      We are not in the office Thursdays. Therefore non- urgent calls and medical messages received on Thursday will be addressed when we are back in the office on Wednesday. Urgent matters will be reviewed and addressed by one of our partners in the office as needed.    If lab work was done today as part of your evaluation you will generally be contacted via [email or web address], mail, or phone with the results within 1-5 days. If there is an alarming result we will contact you by phone. Lab results come back at varying times, I generally wait until all labs are resulted before making  comments on results. Please note labs are automatically released to [facility] (if you have signed up for it) once available-at times you may see these prior to my having a chance to review them as well.    If you need refills please contact your pharmacist. They will send a refill request to me to review. Please allow 3 business days for us to process all refill requests. All narcotic refills should be handled in the clinic at the time of your visit.  What is Tennis Elbow?        Repeated twisting of a screwdriver can cause problems over time.        Tennis elbow (also called lateral epicondylitis) is the gradual break down of the tendon around the bony knob (lateral epicondyle) on the outer side of the elbow. It occurs when the tissue that attaches muscle to the bone becomes irritated and somtimes inflamed.  Your lateral epicondyle  The muscles that allow you to straighten your fingers and rotate your lower arm and wrist are called the extensor muscles.  These muscles extend from the outer side of your elbow to your wrist and fingers. A cord-like fiber called a tendon attaches the extensor muscles to the elbow. Overuse or an accident can cause tissue in the tendon to become inflamed, injured, or degenerated.  Causes  Playing a racket sport can cause tennis elbow. So can doing anything that involves extending your wrist or rotating your forearm, such as:    Twisting a screwdriver    Hammering    Painting    Llifting heavy objects with your palm down  With age, the tissue may become injured or irritated more easily.  Symptoms  Symptoms generally develop over time. The most common symptom of tennis elbow is pain or burning on the outer side of the elbow and down the forearm. You may have pain all the time or only when you lift things. The elbow may also swell. And it may hurt to  things, turn your hand, or swing your arm.      The road to healing  To prevent a flare-up after treatment, you may need to change the way  you do some things. Gripping with the palm up, lifting heavy objects with both hands, or varying activities throughout the day will help reduce stress on the tendon.     6559-3345 [employer]. [address] [place] [address]. All rights reserved. This information is not intended as a substitute for professional medical care. Always follow your healthcare professional's instructions.      Treating Tennis Elbow    Your treatment will depend on how inflamed your tendon is. The goal is to relieve your symptoms and help you regain full use of your elbow.  Rest and medicine  Wearing a tennis elbow splint allows the inflamed tendon to rest. It must be worn properly. It should be placed down the arm past the painful area of the elbow. If it is directly over the inflamed tendon, it can worsen the symptoms. This brace can help the tendon heal. Using your other hand or changing your  also takes stress off the tendon. Oral nonsteroidal anti-inflammatory medicines (NSAIDs) and/or ice can relieve pain and reduce swelling.  Exercises and therapy  Your healthcare provider may give you an exercise program. He or she may refer you to a therapist. The therapist will teach you to gently stretch and then strengthen the muscles around your elbow.  Anti-inflammatory injections  Your healthcare provider may give you injections of an anti-inflammatory, such as cortisone. This helps reduce swelling. You may have more pain at first. But in a few days, your elbow should feel better.  If surgery is needed  If your symptoms persist for a long time, or other treatments don t work, your healthcare provider may recommend surgery. Surgery repairs the inflamed tendon.     6311-1965 [employer]. [address] [place] [address]. All rights reserved. This information is not intended as a substitute for professional medical care. Always follow your healthcare professional's instructions.           Wrist  Flexion (Flexibility)    1. Stand or sit and hold your arms straight out in front of you.  2. Dangle your right hand at the wrist. Gently press down on your right hand with your left hand. Feel the stretch in your right wrist and the top of your hand.  3. Hold for 30 to 60 seconds, then relax.  4. Switch arms and repeat 2 times.   Date Last Reviewed: 3/10/2016    3249-7119 [employer]. [address] [place] [address], [place] [address]. All rights reserved. This information is not intended as a substitute for professional medical care. Always follow your healthcare professional's instructions.        Butch Bronson)  2023 18:49:35 Butch Bronson)  2023 18:47:19

## 2023-03-25 ENCOUNTER — HEALTH MAINTENANCE LETTER (OUTPATIENT)
Age: 38
End: 2023-03-25

## 2023-04-26 ASSESSMENT — ENCOUNTER SYMPTOMS
ABDOMINAL PAIN: 0
PARESTHESIAS: 0
CHILLS: 0
WEAKNESS: 0
PALPITATIONS: 0
FEVER: 0
HEMATOCHEZIA: 0
FREQUENCY: 0
NERVOUS/ANXIOUS: 0
SORE THROAT: 0
EYE PAIN: 0
DYSURIA: 0
JOINT SWELLING: 0
HEARTBURN: 0
DIARRHEA: 0
COUGH: 0
DIZZINESS: 0
CONSTIPATION: 0
ARTHRALGIAS: 0
SHORTNESS OF BREATH: 0
MYALGIAS: 0
HEMATURIA: 0
HEADACHES: 0
NAUSEA: 0

## 2023-04-27 ENCOUNTER — OFFICE VISIT (OUTPATIENT)
Dept: FAMILY MEDICINE | Facility: CLINIC | Age: 38
End: 2023-04-27
Payer: COMMERCIAL

## 2023-04-27 VITALS
HEIGHT: 74 IN | TEMPERATURE: 97.9 F | HEART RATE: 64 BPM | DIASTOLIC BLOOD PRESSURE: 72 MMHG | SYSTOLIC BLOOD PRESSURE: 104 MMHG | BODY MASS INDEX: 30.79 KG/M2 | RESPIRATION RATE: 18 BRPM | WEIGHT: 239.9 LBS | OXYGEN SATURATION: 98 %

## 2023-04-27 DIAGNOSIS — Z00.00 ROUTINE GENERAL MEDICAL EXAMINATION AT A HEALTH CARE FACILITY: Primary | ICD-10-CM

## 2023-04-27 DIAGNOSIS — Z11.4 SCREENING FOR HIV (HUMAN IMMUNODEFICIENCY VIRUS): ICD-10-CM

## 2023-04-27 DIAGNOSIS — Z11.59 NEED FOR HEPATITIS C SCREENING TEST: ICD-10-CM

## 2023-04-27 DIAGNOSIS — M75.102 ROTATOR CUFF SYNDROME, LEFT: ICD-10-CM

## 2023-04-27 DIAGNOSIS — L50.3 DERMATOGRAPHISM: ICD-10-CM

## 2023-04-27 LAB
ALBUMIN SERPL BCG-MCNC: 4.6 G/DL (ref 3.5–5.2)
ALP SERPL-CCNC: 49 U/L (ref 40–129)
ALT SERPL W P-5'-P-CCNC: 22 U/L (ref 10–50)
ANION GAP SERPL CALCULATED.3IONS-SCNC: 10 MMOL/L (ref 7–15)
AST SERPL W P-5'-P-CCNC: 21 U/L (ref 10–50)
BILIRUB SERPL-MCNC: 1.1 MG/DL
BUN SERPL-MCNC: 16.3 MG/DL (ref 6–20)
CALCIUM SERPL-MCNC: 9.5 MG/DL (ref 8.6–10)
CHLORIDE SERPL-SCNC: 98 MMOL/L (ref 98–107)
CHOLEST SERPL-MCNC: 236 MG/DL
CREAT SERPL-MCNC: 1.14 MG/DL (ref 0.67–1.17)
DEPRECATED HCO3 PLAS-SCNC: 27 MMOL/L (ref 22–29)
ERYTHROCYTE [DISTWIDTH] IN BLOOD BY AUTOMATED COUNT: 12.1 % (ref 10–15)
GFR SERPL CREATININE-BSD FRML MDRD: 83 ML/MIN/1.73M2
GLUCOSE SERPL-MCNC: 90 MG/DL (ref 70–99)
HCT VFR BLD AUTO: 43.8 % (ref 40–53)
HDLC SERPL-MCNC: 58 MG/DL
HGB BLD-MCNC: 15 G/DL (ref 13.3–17.7)
LDLC SERPL CALC-MCNC: 166 MG/DL
MCH RBC QN AUTO: 31.9 PG (ref 26.5–33)
MCHC RBC AUTO-ENTMCNC: 34.2 G/DL (ref 31.5–36.5)
MCV RBC AUTO: 93 FL (ref 78–100)
NONHDLC SERPL-MCNC: 178 MG/DL
PLATELET # BLD AUTO: 211 10E3/UL (ref 150–450)
POTASSIUM SERPL-SCNC: 4.2 MMOL/L (ref 3.4–5.3)
PROT SERPL-MCNC: 7.6 G/DL (ref 6.4–8.3)
RBC # BLD AUTO: 4.7 10E6/UL (ref 4.4–5.9)
SODIUM SERPL-SCNC: 135 MMOL/L (ref 136–145)
TRIGL SERPL-MCNC: 58 MG/DL
WBC # BLD AUTO: 8.5 10E3/UL (ref 4–11)

## 2023-04-27 PROCEDURE — 85027 COMPLETE CBC AUTOMATED: CPT | Performed by: STUDENT IN AN ORGANIZED HEALTH CARE EDUCATION/TRAINING PROGRAM

## 2023-04-27 PROCEDURE — 90715 TDAP VACCINE 7 YRS/> IM: CPT | Performed by: STUDENT IN AN ORGANIZED HEALTH CARE EDUCATION/TRAINING PROGRAM

## 2023-04-27 PROCEDURE — 36415 COLL VENOUS BLD VENIPUNCTURE: CPT | Performed by: STUDENT IN AN ORGANIZED HEALTH CARE EDUCATION/TRAINING PROGRAM

## 2023-04-27 PROCEDURE — 87389 HIV-1 AG W/HIV-1&-2 AB AG IA: CPT | Performed by: STUDENT IN AN ORGANIZED HEALTH CARE EDUCATION/TRAINING PROGRAM

## 2023-04-27 PROCEDURE — 99386 PREV VISIT NEW AGE 40-64: CPT | Mod: 25 | Performed by: STUDENT IN AN ORGANIZED HEALTH CARE EDUCATION/TRAINING PROGRAM

## 2023-04-27 PROCEDURE — 80061 LIPID PANEL: CPT | Performed by: STUDENT IN AN ORGANIZED HEALTH CARE EDUCATION/TRAINING PROGRAM

## 2023-04-27 PROCEDURE — 86803 HEPATITIS C AB TEST: CPT | Performed by: STUDENT IN AN ORGANIZED HEALTH CARE EDUCATION/TRAINING PROGRAM

## 2023-04-27 PROCEDURE — 90471 IMMUNIZATION ADMIN: CPT | Performed by: STUDENT IN AN ORGANIZED HEALTH CARE EDUCATION/TRAINING PROGRAM

## 2023-04-27 PROCEDURE — 80053 COMPREHEN METABOLIC PANEL: CPT | Performed by: STUDENT IN AN ORGANIZED HEALTH CARE EDUCATION/TRAINING PROGRAM

## 2023-04-27 ASSESSMENT — ENCOUNTER SYMPTOMS
HEARTBURN: 0
CONSTIPATION: 0
NAUSEA: 0
HEMATOCHEZIA: 0
HEMATURIA: 0
DIZZINESS: 0
NERVOUS/ANXIOUS: 0
FREQUENCY: 0
EYE PAIN: 0
SORE THROAT: 0
JOINT SWELLING: 0
ARTHRALGIAS: 0
ABDOMINAL PAIN: 0
CHILLS: 0
HEADACHES: 0
DYSURIA: 0
COUGH: 0
FEVER: 0
WEAKNESS: 0
MYALGIAS: 0
DIARRHEA: 0
SHORTNESS OF BREATH: 0
PALPITATIONS: 0
PARESTHESIAS: 0

## 2023-04-27 ASSESSMENT — PAIN SCALES - GENERAL: PAINLEVEL: NO PAIN (0)

## 2023-04-27 NOTE — PROGRESS NOTES
SUBJECTIVE:   CC: Zaire is an 38 year old who presents for preventative health visit.       4/27/2023    12:44 PM   Additional Questions   Roomed by Jahaira ANTONIO   Accompanied by Self         4/27/2023    12:44 PM   Patient Reported Additional Medications   Patient reports taking the following new medications None     Patient has been advised of split billing requirements and indicates understanding: Yes  Healthy Habits:     Getting at least 3 servings of Calcium per day:  Yes    Bi-annual eye exam:  NO    Dental care twice a year:  Yes    Sleep apnea or symptoms of sleep apnea:  Excessive snoring    Diet:  Regular (no restrictions)    Frequency of exercise:  2-3 days/week    Duration of exercise:  15-30 minutes    Taking medications regularly:  Yes    Medication side effects:  Not applicable    PHQ-2 Total Score: 0    Additional concerns today:  No    Notes some right shoulder pain with overhead lifting since fall skiing this year.   Seems to be slowly improving for him.  Today's PHQ-2 Score:       4/26/2023     9:49 PM   PHQ-2 ( 1999 [employer])   Q1: Little interest or pleasure in doing things 0   Q2: Feeling down, depressed or hopeless 0   PHQ-2 Score 0   Q1: Little interest or pleasure in doing things Not at all   Q2: Feeling down, depressed or hopeless Not at all   PHQ-2 Score 0       Have you ever done Advance Care Planning? (For example, a Health Directive, POLST, or a discussion with a medical provider or your loved ones about your wishes): No, advance care planning information given to patient to review.  Patient declined advance care planning discussion at this time.    Social History     Tobacco Use     Smoking status: Never     Smokeless tobacco: Never   Vaping Use     Vaping status: Not on file   Substance Use Topics     Alcohol use: Yes     Comment: weekend             4/26/2023     9:48 PM   Alcohol Use   Prescreen: >3 drinks/day or >7 drinks/week? Yes   AUDIT SCORE  7         4/26/2023     9:48 PM    AUDIT - Alcohol Use Disorders Identification Test - Reproduced from the World Health Organization Audit 2001 (Second Edition)   1.  How often do you have a drink containing alcohol? 2 to 3 times a week   2.  How many drinks containing alcohol do you have on a typical day when you are drinking? 3 or 4   3.  How often do you have five or more drinks on one occasion? Weekly   4.  How often during the last year have you found that you were not able to stop drinking once you had started? Never   5.  How often during the last year have you failed to do what was normally expected of you because of drinking? Never   6.  How often during the last year have you needed a first drink in the morning to get yourself going after a heavy drinking session? Never   7.  How often during the last year have you had a feeling of guilt or remorse after drinking? Never   8.  How often during the last year have you been unable to remember what happened the night before because of your drinking? Never   9.  Have you or someone else been injured because of your drinking? No   10. Has a relative, friend, doctor or other health care worker been concerned about your drinking or suggested you cut down? No   TOTAL SCORE 7       Last PSA: No results found for: PSA    Reviewed orders with patient. Reviewed health maintenance and updated orders accordingly - Yes  Lab work is in process    Reviewed and updated as needed this visit by clinical staff   Tobacco  Allergies  Meds              Reviewed and updated as needed this visit by Provider                 Past Medical History:   Diagnosis Date     Diabetes (H)      Heart disease      Hypertension       Past Surgical History:   Procedure Laterality Date     VASECTOMY  2/5/2016       Review of Systems   Constitutional: Negative for chills and fever.   HENT: Negative for congestion, ear pain, hearing loss and sore throat.    Eyes: Negative for pain and visual disturbance.   Respiratory: Negative  "for cough and shortness of breath.    Cardiovascular: Negative for chest pain, palpitations and peripheral edema.   Gastrointestinal: Negative for abdominal pain, constipation, diarrhea, heartburn, hematochezia and nausea.   Genitourinary: Negative for dysuria, frequency, genital sores, hematuria, impotence, penile discharge and urgency.   Musculoskeletal: Negative for arthralgias, joint swelling and myalgias.   Skin: Negative for rash.   Neurological: Negative for dizziness, weakness, headaches and paresthesias.   Psychiatric/Behavioral: Negative for mood changes. The patient is not nervous/anxious.        OBJECTIVE:   /72 (BP Location: Left arm, Patient Position: Sitting, Cuff Size: Adult Large)   Pulse 64   Temp 97.9  F (36.6  C) (Temporal)   Resp 18   Ht 1.887 m (6' 2.29\")   Wt 108.8 kg (239 lb 14.4 oz)   SpO2 98%   BMI 30.56 kg/m      Physical Exam  GENERAL: healthy, alert and no distress  EYES: Eyes grossly normal to inspection, PERRL and conjunctivae and sclerae normal  HENT: ear canals and TM's normal, nose and mouth without ulcers or lesions  NECK: no adenopathy, no asymmetry, masses, or scars and thyroid normal to palpation  RESP: lungs clear to auscultation - no rales, rhonchi or wheezes  CV: regular rate and rhythm, normal S1 S2, no S3 or S4, no murmur, click or rub, no peripheral edema and peripheral pulses strong  ABDOMEN: soft, nontender, no hepatosplenomegaly, no masses and bowel sounds normal  MS: no gross musculoskeletal defects noted, no edema, positive Tanner and negative Neer's, pain with external and internal rotation at the elbow.  No pain with flexion or extension.  No AC joint tenderness.  Negative apprehension.  Range of motion of the shoulder intact.  SKIN: no suspicious lesions or rashes  NEURO: Normal strength and tone, mentation intact and speech normal  PSYCH: mentation appears normal, affect normal/bright    Diagnostic Test Results:  Labs reviewed in " "Epic    ASSESSMENT/PLAN:   Zaire was seen today for physical.    Diagnoses and all orders for this visit:    Routine general medical examination at a health care facility  -     Lipid panel reflex to direct LDL Fasting; Future  -     Comprehensive metabolic panel (BMP + Alb, Alk Phos, ALT, AST, Total. Bili, TP); Future  -     CBC with platelets; Future    Screening for HIV (human immunodeficiency virus)  -     HIV Antigen Antibody Combo; Future    Need for hepatitis C screening test  -     Hepatitis C Screen Reflex to HCV RNA Quant and Genotype; Future    Dermatographism  Continues on antihistamine    Rotator cuff syndrome, left  Exam consistent with left rotator cuff injury.  Unlikely complete tear.  Recommended physical therapy.  He will start home exercises and follow-up with PT if not improving.  Consider imaging in the future if not resolving.    Other orders  -     REVIEW OF HEALTH MAINTENANCE PROTOCOL ORDERS  -     TDAP VACCINE (Adacel, Boostrix)        Patient has been advised of split billing requirements and indicates understanding: Yes      COUNSELING:   Reviewed preventive health counseling, as reflected in patient instructions       Regular exercise       Healthy diet/nutrition       Vision screening       Hearing screening       Immunizations       Aspirin prophylaxis        Alcohol Use        Safe sex practices/STD prevention       Consider Hep C screening for all patients one time for ages 18-79 years       HIV screeninx in teen years, 1x in adult years, and at intervals if high risk       Colorectal cancer screening       Prostate cancer screening      BMI:   Estimated body mass index is 30.56 kg/m  as calculated from the following:    Height as of this encounter: 1.887 m (6' 2.29\").    Weight as of this encounter: 108.8 kg (239 lb 14.4 oz).   Weight management plan: Discussed healthy diet and exercise guidelines      He reports that he has never smoked. He has never used smokeless " tobacco.            Beltran Tabares MD  [facility]

## 2023-04-27 NOTE — NURSING NOTE
Prior to immunization administration, verified patients identity using patient s name and date of birth. Please see Immunization Activity for additional information.     Screening Questionnaire for Adult Immunization    Are you sick today?   No   Do you have allergies to medications, food, a vaccine component or latex?   No   Have you ever had a serious reaction after receiving a vaccination?   No   Do you have a long-term health problem with heart, lung, kidney, or metabolic disease (e.g., diabetes), asthma, a blood disorder, no spleen, complement component deficiency, a cochlear implant, or a spinal fluid leak?  Are you on long-term aspirin therapy?   No   Do you have cancer, leukemia, HIV/AIDS, or any other immune system problem?   No   Do you have a parent, brother, or sister with an immune system problem?   No   In the past 3 months, have you taken medications that affect  your immune system, such as prednisone, other steroids, or anticancer drugs; drugs for the treatment of rheumatoid arthritis, Crohn s disease, or psoriasis; or have you had radiation treatments?   No   Have you had a seizure, or a brain or other nervous system problem?   No   During the past year, have you received a transfusion of blood or blood    products, or been given immune (gamma) globulin or antiviral drug?   No   For women: Are you pregnant or is there a chance you could become       pregnant during the next month?   No   Have you received any vaccinations in the past 4 weeks?   No     Immunization questionnaire answers were all negative.      Injection of  given by Lisa Bowers LPN. Patient instructed to remain in clinic for 15 minutes afterwards, and to report any adverse reactions.     Screening performed by Lisa Bowers LPN on 4/27/2023 at 1:25 PM.

## 2023-04-28 LAB
HCV AB SERPL QL IA: NONREACTIVE
HIV 1+2 AB+HIV1 P24 AG SERPL QL IA: NONREACTIVE

## 2023-10-25 ENCOUNTER — ALLIED HEALTH/NURSE VISIT (OUTPATIENT)
Dept: FAMILY MEDICINE | Facility: CLINIC | Age: 38
End: 2023-10-25
Payer: COMMERCIAL

## 2023-10-25 DIAGNOSIS — Z23 NEED FOR VACCINATION: Primary | ICD-10-CM

## 2023-10-25 PROCEDURE — 90686 IIV4 VACC NO PRSV 0.5 ML IM: CPT

## 2023-10-25 PROCEDURE — 99207 PR NO CHARGE NURSE ONLY: CPT

## 2023-10-25 PROCEDURE — 90471 IMMUNIZATION ADMIN: CPT

## 2023-12-20 NOTE — LETTER
July 30, 2018      Zaire Olivares  [id number] [facility] [id number]        Dear ,    We are writing to inform you of your test results.  Cholesterol was 211 which is borderline elevated.   LDL cholesterol was 120 which is acceptable.  Just watch fatty foods and calories in your diet       Resulted Orders   Lipid Profile   Result Value Ref Range    Cholesterol 211 (H) <200 mg/dL      Comment:      Desirable:       <200 mg/dl    Triglycerides 223 (H) <150 mg/dL      Comment:      Borderline high:  150-199 mg/dl  High:             200-499 mg/dl  Very high:       >499 mg/dl  Fasting specimen      HDL Cholesterol 46 >39 mg/dL    LDL Cholesterol Calculated 120 (H) <100 mg/dL      Comment:      Above desirable:  100-129 mg/dl  Borderline High:  130-159 mg/dL  High:             160-189 mg/dL  Very high:       >189 mg/dl      Non HDL Cholesterol 165 (H) <130 mg/dL      Comment:      Above Desirable:  130-159 mg/dl  Borderline high:  160-189 mg/dl  High:             190-219 mg/dl  Very high:       >219 mg/dl         If you have any questions or concerns, please call the clinic at the number listed above.       Sincerely,        Luis Eddy MD                
No

## 2024-02-27 ENCOUNTER — ANCILLARY PROCEDURE (OUTPATIENT)
Dept: GENERAL RADIOLOGY | Facility: OTHER | Age: 39
End: 2024-02-27
Attending: STUDENT IN AN ORGANIZED HEALTH CARE EDUCATION/TRAINING PROGRAM
Payer: COMMERCIAL

## 2024-02-27 ENCOUNTER — OFFICE VISIT (OUTPATIENT)
Dept: FAMILY MEDICINE | Facility: OTHER | Age: 39
End: 2024-02-27
Payer: COMMERCIAL

## 2024-02-27 VITALS
RESPIRATION RATE: 16 BRPM | SYSTOLIC BLOOD PRESSURE: 102 MMHG | TEMPERATURE: 98.2 F | WEIGHT: 243 LBS | OXYGEN SATURATION: 98 % | BODY MASS INDEX: 31.18 KG/M2 | DIASTOLIC BLOOD PRESSURE: 72 MMHG | HEIGHT: 74 IN | HEART RATE: 62 BPM

## 2024-02-27 DIAGNOSIS — R10.84 ABDOMINAL PAIN, GENERALIZED: Primary | ICD-10-CM

## 2024-02-27 DIAGNOSIS — R10.9 FLANK PAIN: ICD-10-CM

## 2024-02-27 DIAGNOSIS — Z13.220 LIPID SCREENING: ICD-10-CM

## 2024-02-27 LAB
ALBUMIN SERPL BCG-MCNC: 4.5 G/DL (ref 3.5–5.2)
ALBUMIN UR-MCNC: NEGATIVE MG/DL
ALP SERPL-CCNC: 47 U/L (ref 40–150)
ALT SERPL W P-5'-P-CCNC: 17 U/L (ref 0–70)
ANION GAP SERPL CALCULATED.3IONS-SCNC: 10 MMOL/L (ref 7–15)
APPEARANCE UR: CLEAR
AST SERPL W P-5'-P-CCNC: 21 U/L (ref 0–45)
BILIRUB SERPL-MCNC: 0.8 MG/DL
BILIRUB UR QL STRIP: NEGATIVE
BUN SERPL-MCNC: 18.8 MG/DL (ref 6–20)
CALCIUM SERPL-MCNC: 9.8 MG/DL (ref 8.6–10)
CHLORIDE SERPL-SCNC: 102 MMOL/L (ref 98–107)
CHOLEST SERPL-MCNC: 228 MG/DL
COLOR UR AUTO: YELLOW
CREAT SERPL-MCNC: 1.16 MG/DL (ref 0.67–1.17)
DEPRECATED HCO3 PLAS-SCNC: 25 MMOL/L (ref 22–29)
EGFRCR SERPLBLD CKD-EPI 2021: 82 ML/MIN/1.73M2
ERYTHROCYTE [DISTWIDTH] IN BLOOD BY AUTOMATED COUNT: 11.9 % (ref 10–15)
FASTING STATUS PATIENT QL REPORTED: NO
GLUCOSE SERPL-MCNC: 90 MG/DL (ref 70–99)
GLUCOSE UR STRIP-MCNC: NEGATIVE MG/DL
HCT VFR BLD AUTO: 43.7 % (ref 40–53)
HDLC SERPL-MCNC: 49 MG/DL
HGB BLD-MCNC: 15.1 G/DL (ref 13.3–17.7)
HGB UR QL STRIP: NEGATIVE
KETONES UR STRIP-MCNC: NEGATIVE MG/DL
LDLC SERPL CALC-MCNC: 155 MG/DL
LEUKOCYTE ESTERASE UR QL STRIP: NEGATIVE
LIPASE SERPL-CCNC: 28 U/L (ref 13–60)
MCH RBC QN AUTO: 31.8 PG (ref 26.5–33)
MCHC RBC AUTO-ENTMCNC: 34.6 G/DL (ref 31.5–36.5)
MCV RBC AUTO: 92 FL (ref 78–100)
NITRATE UR QL: NEGATIVE
NONHDLC SERPL-MCNC: 179 MG/DL
PH UR STRIP: 6.5 [PH] (ref 5–7)
PLATELET # BLD AUTO: 237 10E3/UL (ref 150–450)
POTASSIUM SERPL-SCNC: 4.6 MMOL/L (ref 3.4–5.3)
PROT SERPL-MCNC: 7.8 G/DL (ref 6.4–8.3)
RBC # BLD AUTO: 4.75 10E6/UL (ref 4.4–5.9)
SODIUM SERPL-SCNC: 137 MMOL/L (ref 135–145)
SP GR UR STRIP: 1.01 (ref 1–1.03)
TRIGL SERPL-MCNC: 122 MG/DL
UROBILINOGEN UR STRIP-ACNC: 0.2 E.U./DL
WBC # BLD AUTO: 6.3 10E3/UL (ref 4–11)

## 2024-02-27 PROCEDURE — 80061 LIPID PANEL: CPT | Performed by: STUDENT IN AN ORGANIZED HEALTH CARE EDUCATION/TRAINING PROGRAM

## 2024-02-27 PROCEDURE — 83690 ASSAY OF LIPASE: CPT | Performed by: STUDENT IN AN ORGANIZED HEALTH CARE EDUCATION/TRAINING PROGRAM

## 2024-02-27 PROCEDURE — 99214 OFFICE O/P EST MOD 30 MIN: CPT | Performed by: STUDENT IN AN ORGANIZED HEALTH CARE EDUCATION/TRAINING PROGRAM

## 2024-02-27 PROCEDURE — 81003 URINALYSIS AUTO W/O SCOPE: CPT | Performed by: STUDENT IN AN ORGANIZED HEALTH CARE EDUCATION/TRAINING PROGRAM

## 2024-02-27 PROCEDURE — 85027 COMPLETE CBC AUTOMATED: CPT | Performed by: STUDENT IN AN ORGANIZED HEALTH CARE EDUCATION/TRAINING PROGRAM

## 2024-02-27 PROCEDURE — 36415 COLL VENOUS BLD VENIPUNCTURE: CPT | Performed by: STUDENT IN AN ORGANIZED HEALTH CARE EDUCATION/TRAINING PROGRAM

## 2024-02-27 PROCEDURE — 74019 RADEX ABDOMEN 2 VIEWS: CPT | Mod: TC | Performed by: RADIOLOGY

## 2024-02-27 PROCEDURE — 80053 COMPREHEN METABOLIC PANEL: CPT | Performed by: STUDENT IN AN ORGANIZED HEALTH CARE EDUCATION/TRAINING PROGRAM

## 2024-02-27 ASSESSMENT — PAIN SCALES - GENERAL: PAINLEVEL: MILD PAIN (3)

## 2024-02-27 NOTE — PROGRESS NOTES
Assessment & Plan     Abdominal pain, generalized  - CBC with platelets  - Comprehensive metabolic panel  - Lipase  - UA Macroscopic with reflex to Microscopic and Culture  - XR Abdomen 2 Views  Flank pain  Interesting distribution of pain and did start shortly after an episode of strenuous activity (chopping wood) and subsequent alcohol intake.  Pain is in particular in his flanks and sometimes migrating to his lateral abdomen sometimes even epigastric.  No abnormal bowel or bladder patterns.  No blood in stool or urine.  Sometimes movement does make the pain worse but overall no consistent pattern of things making it better or worse.  Multiple differentials including musculoskeletal, internal organ pathology including cholecystitis/lithiasis, UTI, kidney stones, pancreatitis, etc.  Will do initial labs of intra-abdominal concern but very well could be musculoskeletal in nature.  Worrisome signs and symptoms were discussed and patient does agree and understands.  Normal exam findings today    Lipid screening  Per patient request  - Lipid panel reflex to direct LDL Non-fasting        Subjective   Zaire is a 38 year old, presenting for the following health issues:  Abdominal Pain        2/27/2024     9:31 AM   Additional Questions   Roomed by jeanine   Accompanied by alone         2/27/2024     9:31 AM   Patient Reported Additional Medications   Patient reports taking the following new medications none     History of Present Illness       Reason for visit:  Lower back, abdominal discomfort  Symptom onset:  1-2 weeks ago  Symptoms include:  Just daily discomfort around lower back/sides/abdomen. Maybe more noticeable after eating or drinking??  Symptom intensity:  Mild  Symptom progression:  Staying the same  Had these symptoms before:  No  What makes it worse:  Alcohol maybe?    He eats 2-3 servings of fruits and vegetables daily.He consumes 1 sweetened beverage(s) daily.He exercises with enough effort to increase  "his heart rate 30 to 60 minutes per day.  He exercises with enough effort to increase his heart rate 3 or less days per week.   He is taking medications regularly.           Review of Systems  Constitutional, HEENT, cardiovascular, pulmonary, gi and gu systems are negative, except as otherwise noted.      Objective    /72   Pulse 62   Temp 98.2  F (36.8  C) (Temporal)   Resp 16   Ht 1.887 m (6' 2.29\")   Wt 110.2 kg (243 lb)   SpO2 98%   BMI 30.96 kg/m    Body mass index is 30.96 kg/m .  Physical Exam  Vitals and nursing note reviewed.   Constitutional:       General: He is not in acute distress.     Appearance: Normal appearance. He is not ill-appearing, toxic-appearing or diaphoretic.   HENT:      Head: Normocephalic and atraumatic.      Right Ear: Tympanic membrane, ear canal and external ear normal. There is no impacted cerumen.      Left Ear: Tympanic membrane, ear canal and external ear normal. There is no impacted cerumen.      Nose: Nose normal. No congestion or rhinorrhea.      Mouth/Throat:      Mouth: Mucous membranes are moist.      Pharynx: Oropharynx is clear. No oropharyngeal exudate or posterior oropharyngeal erythema.   Eyes:      General: No scleral icterus.        Right eye: No discharge.         Left eye: No discharge.      Extraocular Movements: Extraocular movements intact.      Conjunctiva/sclera: Conjunctivae normal.      Pupils: Pupils are equal, round, and reactive to light.   Cardiovascular:      Rate and Rhythm: Normal rate and regular rhythm.      Heart sounds: No murmur heard.  Pulmonary:      Effort: Pulmonary effort is normal. No respiratory distress.      Breath sounds: Normal breath sounds. No stridor.   Abdominal:      General: There is no distension.      Palpations: Abdomen is soft.      Tenderness: There is no abdominal tenderness.      Hernia: No hernia is present.   Musculoskeletal:         General: Normal range of motion.      Cervical back: Normal range of " motion.      Right lower leg: No edema.      Left lower leg: No edema.   Lymphadenopathy:      Cervical: No cervical adenopathy.   Skin:     General: Skin is warm.   Neurological:      Mental Status: He is alert.   Psychiatric:         Mood and Affect: Mood normal.         Behavior: Behavior normal.         Thought Content: Thought content normal.         Judgment: Judgment normal.            Signed Electronically by: ACE SINGH MD

## 2024-02-27 NOTE — RESULT ENCOUNTER NOTE
Zaire,    Here are your most recent results from your clinic visit.    Cholesterol levels are elevated, not to the point where I would recommended any prescription medication. I would encourage healthy lifestyle choices including improved diet and more exercise.  Any exercise is reasonable as long as you are elevating your heart rate and breaking a sweat for up to 20 to 30 minutes/day.  In regards to dietary changes cut down on processed food, eliminate added sugar, and cut down on refined carbohydrates (white bread, pastries, cereals, etc...).  It is best to home-cook your meals and if you need a snack in between meals consider fruits and vegetables.    Xray does show a moderate amount of stool. If you have concerns about constipation, consider more water, fiber, or every over the counter stool softener supplements.     All other labs normal/negative.       If you have any questions feel free to call the clinic at [phone].    Thank you,    Joe Zazueta MD

## 2024-02-27 NOTE — PATIENT INSTRUCTIONS
-Tylenol and ibuprofen over-the-counter: (follow directions on the bottle).  Tylenol is a safer option.  Avoid ibuprofen if you have chronic kidney disease or history of severe GERD or gastric ulcers.  If you do take ibuprofen, take this with food and a large glass of water.  -Topical treatments over the counter: such as heat or ice (20 minutes on and 20 minutes off, alternate between the 2), Voltaren gel, IcyHot, Biofreeze, Aspercreme, Blue emu, Tiger balm, etc... (whatever you feel comfortable spending your money on)  -Sleep - as much as you need to feel satisfied, consider 7 hours  -Exercise - 30 min/day, 5 days/week  -Nutrition -  Anti-inflammatory diet  i.e. low sugar, no processed, home cooked,  -Stress Management                [employer]  Reviewed:2023

## 2024-05-23 ENCOUNTER — OFFICE VISIT (OUTPATIENT)
Dept: FAMILY MEDICINE | Facility: CLINIC | Age: 39
End: 2024-05-23
Payer: COMMERCIAL

## 2024-05-23 VITALS
HEIGHT: 75 IN | OXYGEN SATURATION: 99 % | WEIGHT: 243.44 LBS | SYSTOLIC BLOOD PRESSURE: 108 MMHG | HEART RATE: 62 BPM | RESPIRATION RATE: 18 BRPM | TEMPERATURE: 97.5 F | DIASTOLIC BLOOD PRESSURE: 74 MMHG | BODY MASS INDEX: 30.27 KG/M2

## 2024-05-23 DIAGNOSIS — L08.9 INFECTED INCLUSION CYST: Primary | ICD-10-CM

## 2024-05-23 DIAGNOSIS — L72.0 INFECTED INCLUSION CYST: Primary | ICD-10-CM

## 2024-05-23 PROCEDURE — 99213 OFFICE O/P EST LOW 20 MIN: CPT | Performed by: STUDENT IN AN ORGANIZED HEALTH CARE EDUCATION/TRAINING PROGRAM

## 2024-05-23 RX ORDER — CEPHALEXIN 500 MG/1
500 CAPSULE ORAL 2 TIMES DAILY
Qty: 14 CAPSULE | Refills: 0 | Status: SHIPPED | OUTPATIENT
Start: 2024-05-23 | End: 2024-05-30

## 2024-05-23 ASSESSMENT — PAIN SCALES - GENERAL: PAINLEVEL: NO PAIN (0)

## 2024-05-23 NOTE — PROGRESS NOTES
"  Assessment & Plan   Problem List Items Addressed This Visit    None  Visit Diagnoses       Infected inclusion cyst    -  Primary    Relevant Medications    cephALEXin (KEFLEX) 500 MG capsule        Patient's right upper lip with area of induration and was able to expel watery drainage with compression today.  Mild redness at the site with no other significant lip or throat swelling that would be more concerning for allergic reaction.  He has no other itching symptoms or new exposures that he is aware of.  We did discuss I&D today but as this is already draining with compression he would rather do trial of Keflex now which I would recommend.  Discussed potential for recurrence and need for removal or I&D in the future if this is the case.  No allergies to antibiotics.  Follow-up if worsening or recurrent symptoms or things not completely resolving         BMI  Estimated body mass index is 30.27 kg/m  as calculated from the following:    Height as of this encounter: 1.91 m (6' 3.2\").    Weight as of this encounter: 110.4 kg (243 lb 7 oz).   Weight management plan: Discussed healthy diet and exercise guidelines        Vale Tai is a 39 year old, presenting for the following health issues:  Oral Swelling (X1 day)        5/23/2024     6:53 AM   Additional Questions   Roomed by Sadaf     History of Present Illness       Reason for visit:  Swollen lip. Infection from a pimple or something??  Symptom onset:  1-3 days ago  Symptoms include:  Just the swollen lip.  Symptom intensity:  Mild  Symptom progression:  Worsening  Had these symptoms before:  No    He eats 2-3 servings of fruits and vegetables daily.He consumes 1 sweetened beverage(s) daily.He exercises with enough effort to increase his heart rate 10 to 19 minutes per day.  He exercises with enough effort to increase his heart rate 4 days per week.   He is taking medications regularly.       Patient notes having history of feeling lump on that side of " "his lip but for the last several days this has been more swollen and slightly tender.  He has squeezed on this and then discharge that has been pussy and clear.  Thought slightly bigger today so he wanted it evaluated.  Mild erythema but does have tenderness.  No other history of allergies or new exposures he is aware of.  No recent dental work.  No breathing concerns and generally feeling well without fevers.      Review of Systems  Constitutional, HEENT, cardiovascular, pulmonary, GI, , musculoskeletal, neuro, skin, endocrine and psych systems are negative, except as otherwise noted.      Objective    /74   Pulse 62   Temp 97.5  F (36.4  C) (Temporal)   Resp 18   Ht 1.91 m (6' 3.2\")   Wt 110.4 kg (243 lb 7 oz)   SpO2 99%   BMI 30.27 kg/m    Body mass index is 30.27 kg/m .  Physical Exam   GENERAL: alert and no distress  EYES: Eyes grossly normal to inspection, PERRL and conjunctivae and sclerae normal  HENT: ear canals and TM's normal, nose and mouth without ulcers or lesions  NECK: no adenopathy, no asymmetry, masses, or scars  RESP: lungs clear to auscultation - no rales, rhonchi or wheezes  CV: regular rate and rhythm, normal S1 S2, no S3 or S4, no murmur, click or rub, no peripheral edema  MS: no gross musculoskeletal defects noted, no edema  SKIN: Right upper lip with area of induration and drainage with compression, mild erythema and swelling noted  NEURO:  mentation intact and speech normal  PSYCH: mentation appears normal, affect normal/bright          Signed Electronically by: Beltran Tabares MD    "

## 2024-08-03 ENCOUNTER — HEALTH MAINTENANCE LETTER (OUTPATIENT)
Age: 39
End: 2024-08-03

## 2025-08-16 ENCOUNTER — HEALTH MAINTENANCE LETTER (OUTPATIENT)
Age: 40
End: 2025-08-16

## 2025-08-26 ENCOUNTER — OFFICE VISIT (OUTPATIENT)
Dept: FAMILY MEDICINE | Facility: OTHER | Age: 40
End: 2025-08-26
Payer: COMMERCIAL

## 2025-08-26 VITALS
HEART RATE: 60 BPM | BODY MASS INDEX: 31.06 KG/M2 | HEIGHT: 74 IN | TEMPERATURE: 98.2 F | OXYGEN SATURATION: 96 % | DIASTOLIC BLOOD PRESSURE: 80 MMHG | SYSTOLIC BLOOD PRESSURE: 118 MMHG | WEIGHT: 242 LBS | RESPIRATION RATE: 22 BRPM

## 2025-08-26 DIAGNOSIS — J22 LRTI (LOWER RESPIRATORY TRACT INFECTION): Primary | ICD-10-CM

## 2025-08-26 PROCEDURE — 99213 OFFICE O/P EST LOW 20 MIN: CPT | Performed by: PHYSICIAN ASSISTANT

## 2025-08-26 PROCEDURE — 3079F DIAST BP 80-89 MM HG: CPT | Performed by: PHYSICIAN ASSISTANT

## 2025-08-26 PROCEDURE — 1126F AMNT PAIN NOTED NONE PRSNT: CPT | Performed by: PHYSICIAN ASSISTANT

## 2025-08-26 PROCEDURE — 3074F SYST BP LT 130 MM HG: CPT | Performed by: PHYSICIAN ASSISTANT

## 2025-08-26 RX ORDER — BENZONATATE 200 MG/1
200 CAPSULE ORAL 3 TIMES DAILY PRN
Qty: 30 CAPSULE | Refills: 0 | Status: SHIPPED | OUTPATIENT
Start: 2025-08-26

## 2025-08-26 RX ORDER — AZITHROMYCIN 250 MG/1
TABLET, FILM COATED ORAL
Qty: 6 TABLET | Refills: 0 | Status: SHIPPED | OUTPATIENT
Start: 2025-08-26

## 2025-08-26 RX ORDER — ALBUTEROL SULFATE 90 UG/1
2 INHALANT RESPIRATORY (INHALATION) EVERY 4 HOURS PRN
Qty: 18 G | Refills: 0 | Status: SHIPPED | OUTPATIENT
Start: 2025-08-26

## 2025-08-26 ASSESSMENT — ENCOUNTER SYMPTOMS: COUGH: 1

## 2025-08-26 ASSESSMENT — PAIN SCALES - GENERAL: PAINLEVEL_OUTOF10: NO PAIN (0)
